# Patient Record
Sex: FEMALE | Race: WHITE | NOT HISPANIC OR LATINO | Employment: FULL TIME | URBAN - METROPOLITAN AREA
[De-identification: names, ages, dates, MRNs, and addresses within clinical notes are randomized per-mention and may not be internally consistent; named-entity substitution may affect disease eponyms.]

---

## 2017-06-24 ENCOUNTER — APPOINTMENT (OUTPATIENT)
Dept: GENERAL RADIOLOGY | Facility: HOSPITAL | Age: 52
End: 2017-06-24

## 2017-06-24 ENCOUNTER — HOSPITAL ENCOUNTER (EMERGENCY)
Facility: HOSPITAL | Age: 52
Discharge: HOME | End: 2017-06-24
Attending: EMERGENCY MEDICINE | Admitting: EMERGENCY MEDICINE

## 2017-06-24 VITALS
TEMPERATURE: 98.9 F | HEIGHT: 68 IN | RESPIRATION RATE: 18 BRPM | HEART RATE: 61 BPM | OXYGEN SATURATION: 100 % | WEIGHT: 170 LBS | DIASTOLIC BLOOD PRESSURE: 97 MMHG | BODY MASS INDEX: 25.76 KG/M2 | SYSTOLIC BLOOD PRESSURE: 160 MMHG

## 2017-06-24 DIAGNOSIS — I10 ESSENTIAL HYPERTENSION: Primary | ICD-10-CM

## 2017-06-24 LAB
ALBUMIN SERPL-MCNC: 4.7 G/DL (ref 3.5–5.2)
ALBUMIN/GLOB SERPL: 1.6 G/DL
ALP SERPL-CCNC: 112 U/L (ref 39–117)
ALT SERPL W P-5'-P-CCNC: 18 U/L (ref 1–33)
ANION GAP SERPL CALCULATED.3IONS-SCNC: 12.3 MMOL/L
AST SERPL-CCNC: 21 U/L (ref 1–32)
BASOPHILS # BLD AUTO: 0.04 10*3/MM3 (ref 0–0.2)
BASOPHILS NFR BLD AUTO: 0.4 % (ref 0–1.5)
BILIRUB SERPL-MCNC: 0.3 MG/DL (ref 0.1–1.2)
BUN BLD-MCNC: 10 MG/DL (ref 6–20)
BUN/CREAT SERPL: 10 (ref 7–25)
CALCIUM SPEC-SCNC: 9.4 MG/DL (ref 8.6–10.5)
CHLORIDE SERPL-SCNC: 101 MMOL/L (ref 98–107)
CO2 SERPL-SCNC: 27.7 MMOL/L (ref 22–29)
CREAT BLD-MCNC: 1 MG/DL (ref 0.57–1)
DEPRECATED RDW RBC AUTO: 44.6 FL (ref 37–54)
EOSINOPHIL # BLD AUTO: 0.16 10*3/MM3 (ref 0–0.7)
EOSINOPHIL NFR BLD AUTO: 1.6 % (ref 0.3–6.2)
ERYTHROCYTE [DISTWIDTH] IN BLOOD BY AUTOMATED COUNT: 13.1 % (ref 11.7–13)
GFR SERPL CREATININE-BSD FRML MDRD: 58 ML/MIN/1.73
GLOBULIN UR ELPH-MCNC: 3 GM/DL
GLUCOSE BLD-MCNC: 100 MG/DL (ref 65–99)
HCG SERPL QL: NEGATIVE
HCT VFR BLD AUTO: 39.6 % (ref 35.6–45.5)
HGB BLD-MCNC: 13.2 G/DL (ref 11.9–15.5)
HOLD SPECIMEN: NORMAL
IMM GRANULOCYTES # BLD: 0 10*3/MM3 (ref 0–0.03)
IMM GRANULOCYTES NFR BLD: 0 % (ref 0–0.5)
LYMPHOCYTES # BLD AUTO: 4.14 10*3/MM3 (ref 0.9–4.8)
LYMPHOCYTES NFR BLD AUTO: 40.7 % (ref 19.6–45.3)
MCH RBC QN AUTO: 31.1 PG (ref 26.9–32)
MCHC RBC AUTO-ENTMCNC: 33.3 G/DL (ref 32.4–36.3)
MCV RBC AUTO: 93.4 FL (ref 80.5–98.2)
MONOCYTES # BLD AUTO: 0.57 10*3/MM3 (ref 0.2–1.2)
MONOCYTES NFR BLD AUTO: 5.6 % (ref 5–12)
NEUTROPHILS # BLD AUTO: 5.27 10*3/MM3 (ref 1.9–8.1)
NEUTROPHILS NFR BLD AUTO: 51.7 % (ref 42.7–76)
PLATELET # BLD AUTO: 280 10*3/MM3 (ref 140–500)
PMV BLD AUTO: 10.4 FL (ref 6–12)
POTASSIUM BLD-SCNC: 4.3 MMOL/L (ref 3.5–5.2)
PROT SERPL-MCNC: 7.7 G/DL (ref 6–8.5)
RBC # BLD AUTO: 4.24 10*6/MM3 (ref 3.9–5.2)
SODIUM BLD-SCNC: 141 MMOL/L (ref 136–145)
TROPONIN T SERPL-MCNC: <0.01 NG/ML (ref 0–0.03)
WBC NRBC COR # BLD: 10.18 10*3/MM3 (ref 4.5–10.7)
WHOLE BLOOD HOLD SPECIMEN: NORMAL
WHOLE BLOOD HOLD SPECIMEN: NORMAL

## 2017-06-24 PROCEDURE — 84484 ASSAY OF TROPONIN QUANT: CPT | Performed by: EMERGENCY MEDICINE

## 2017-06-24 PROCEDURE — 80053 COMPREHEN METABOLIC PANEL: CPT | Performed by: EMERGENCY MEDICINE

## 2017-06-24 PROCEDURE — 93005 ELECTROCARDIOGRAM TRACING: CPT

## 2017-06-24 PROCEDURE — 85025 COMPLETE CBC W/AUTO DIFF WBC: CPT | Performed by: EMERGENCY MEDICINE

## 2017-06-24 PROCEDURE — 99284 EMERGENCY DEPT VISIT MOD MDM: CPT

## 2017-06-24 PROCEDURE — 36415 COLL VENOUS BLD VENIPUNCTURE: CPT | Performed by: EMERGENCY MEDICINE

## 2017-06-24 PROCEDURE — 71020 HC CHEST PA AND LATERAL: CPT

## 2017-06-24 PROCEDURE — 93010 ELECTROCARDIOGRAM REPORT: CPT | Performed by: INTERNAL MEDICINE

## 2017-06-24 PROCEDURE — 84703 CHORIONIC GONADOTROPIN ASSAY: CPT | Performed by: EMERGENCY MEDICINE

## 2017-06-24 RX ORDER — ZOLPIDEM TARTRATE 10 MG/1
10 TABLET ORAL NIGHTLY PRN
COMMUNITY

## 2017-06-24 RX ORDER — SODIUM CHLORIDE 0.9 % (FLUSH) 0.9 %
10 SYRINGE (ML) INJECTION AS NEEDED
Status: DISCONTINUED | OUTPATIENT
Start: 2017-06-24 | End: 2017-06-24 | Stop reason: HOSPADM

## 2017-06-24 RX ORDER — AMLODIPINE BESYLATE 5 MG/1
5 TABLET ORAL DAILY
Qty: 30 TABLET | Refills: 0 | Status: SHIPPED | OUTPATIENT
Start: 2017-06-24 | End: 2017-06-27 | Stop reason: SDUPTHER

## 2017-06-24 RX ORDER — AMLODIPINE BESYLATE 10 MG/1
10 TABLET ORAL ONCE
Status: COMPLETED | OUTPATIENT
Start: 2017-06-24 | End: 2017-06-24

## 2017-06-24 RX ADMIN — AMLODIPINE BESYLATE 10 MG: 10 TABLET ORAL at 16:52

## 2017-06-24 NOTE — ED PROVIDER NOTES
" EMERGENCY DEPARTMENT ENCOUNTER    CHIEF COMPLAINT  Chief Complaint: hypertension  History given by: pt  History limited by: nothing  Room Number:   PMD: No Known Provider      HPI:  Pt is a 52 y.o. female who presents complaining of worsening hypertension onset earlier today. Pt has chronic hypertension and is on a beta blocker. Pt admits to chest pain, SOA, diaphoresis, and restlessness but denies headache during this episode.    Duration:  Quarter day  Onset: gradual  Timing: constant  Location: N/A  Radiation: none  Quality: \"out of control BP\"  Intensity/Severity: moderate  Progression: worsening  Associated Symptoms: chest pain, SOA, diaphoresis, restlessness  Aggravating Factors: unknown  Alleviating Factors: unknown  Previous Episodes: Pt has chronic hypertension.  Treatment before arrival: PT is on a beta blocker for hypertension.    PAST MEDICAL HISTORY  Active Ambulatory Problems     Diagnosis Date Noted   • Cervical disc disorder with radiculopathy of mid-cervical region 2016   • Degeneration of lumbar intervertebral disc 2016     Resolved Ambulatory Problems     Diagnosis Date Noted   • No Resolved Ambulatory Problems     Past Medical History:   Diagnosis Date   • Anxiety    • Arthritis    • Chronic back pain    • Chronic neck pain    • Hypertension    • Injury of back    • Migraine        PAST SURGICAL HISTORY  Past Surgical History:   Procedure Laterality Date   • BACK SURGERY      lumbar X 2   • BREAST AUGMENTATION Bilateral    •  SECTION     • DILATATION AND CURETTAGE     • FOOT SURGERY Right     bunionectormy, x2, bunion and 2nd toe   • LUMBAR SPINE SURGERY     • SHOULDER SURGERY      slap tear repair   • THORACIC SPINE SURGERY         FAMILY HISTORY  Family History   Problem Relation Age of Onset   • Diabetes Mother    • Cancer Brother      COLON   • Hypertension Brother    • Heart disease Brother    • Hypertension Brother        SOCIAL HISTORY  Social History     Social " History   • Marital status: Single     Spouse name: N/A   • Number of children: 1   • Years of education: COLLEGE     Occupational History   • SURGICAL TECH/ASSISTANT       Social History Main Topics   • Smoking status: Current Every Day Smoker     Packs/day: 0.50     Types: Cigarettes   • Smokeless tobacco: Not on file   • Alcohol use No   • Drug use: Yes     Special: Oxycodone   • Sexual activity: Defer     Other Topics Concern   • Not on file     Social History Narrative       ALLERGIES  Review of patient's allergies indicates no known allergies.    REVIEW OF SYSTEMS  Review of Systems   Constitutional: Positive for diaphoresis. Negative for fever.   HENT: Negative for sore throat.    Eyes: Negative.    Respiratory: Positive for shortness of breath. Negative for cough.    Cardiovascular: Positive for chest pain.   Gastrointestinal: Negative for abdominal pain, diarrhea and vomiting.   Genitourinary: Negative for dysuria.   Musculoskeletal: Negative for neck pain.   Skin: Negative for rash.   Allergic/Immunologic: Negative.    Neurological: Negative for weakness, numbness and headaches.        Restlessness   Hematological: Negative.    Psychiatric/Behavioral: Negative.    All other systems reviewed and are negative.      PHYSICAL EXAM  ED Triage Vitals   Temp Heart Rate Resp BP SpO2   06/24/17 1432 06/24/17 1432 06/24/17 1432 06/24/17 1500 06/24/17 1432   98.9 °F (37.2 °C) 56 15 179/109 99 %      Temp src Heart Rate Source Patient Position BP Location FiO2 (%)   06/24/17 1432 -- -- -- --   Tympanic           Physical Exam   Constitutional: She is oriented to person, place, and time and well-developed, well-nourished, and in no distress. No distress.   HENT:   Head: Normocephalic and atraumatic.   Eyes: EOM are normal. Pupils are equal, round, and reactive to light.   Neck: Normal range of motion. Neck supple.   Cardiovascular: Normal rate, regular rhythm and normal heart sounds.    Pulmonary/Chest: Effort normal  and breath sounds normal. No respiratory distress.   Abdominal: Soft. There is no tenderness. There is no rebound and no guarding.   Musculoskeletal: Normal range of motion. She exhibits no edema.   Neurological: She is alert and oriented to person, place, and time. She has normal sensation and normal strength.   Skin: Skin is warm and dry. No rash noted.   Psychiatric: Mood and affect normal.   Nursing note and vitals reviewed.      LAB RESULTS  Lab Results (last 24 hours)     Procedure Component Value Units Date/Time    CBC & Differential [[id number]] Collected:  06/24/17 1514    Specimen:  Blood Updated:  06/24/17 1535    Narrative:       The following orders were created for panel order CBC & Differential.  Procedure                               Abnormality         Status                     ---------                               -----------         ------                     CBC Auto Differential[[id number]]         Abnormal            Final result                 Please view results for these tests on the individual orders.    Comprehensive Metabolic Panel [[id number]]  (Abnormal) Collected:  06/24/17 1514    Specimen:  Blood Updated:  06/24/17 1605     Glucose 100 (H) mg/dL      BUN 10 mg/dL      Creatinine 1.00 mg/dL      Sodium 141 mmol/L      Potassium 4.3 mmol/L      Chloride 101 mmol/L      CO2 27.7 mmol/L      Calcium 9.4 mg/dL      Total Protein 7.7 g/dL      Albumin 4.70 g/dL      ALT (SGPT) 18 U/L      AST (SGOT) 21 U/L      Alkaline Phosphatase 112 U/L      Total Bilirubin 0.3 mg/dL      eGFR Non African Amer 58 (L) mL/min/1.73      Globulin 3.0 gm/dL      A/G Ratio 1.6 g/dL      BUN/Creatinine Ratio 10.0     Anion Gap 12.3 mmol/L     Troponin [[id number]]  (Normal) Collected:  06/24/17 1514    Specimen:  Blood Updated:  06/24/17 1605     Troponin T <0.010 ng/mL     Narrative:       Troponin T Reference Ranges:  Less than 0.03 ng/mL:    Negative for AMI  0.03 to 0.09 ng/mL:      Indeterminant for  AMI  Greater than 0.09 ng/mL: Positive for AMI    hCG, Serum, Qualitative [[id number]]  (Normal) Collected:  06/24/17 1514    Specimen:  Blood Updated:  06/24/17 1551     HCG Qualitative Negative    CBC Auto Differential [[id number]]  (Abnormal) Collected:  06/24/17 1514    Specimen:  Blood Updated:  06/24/17 1535     WBC 10.18 10*3/mm3      RBC 4.24 10*6/mm3      Hemoglobin 13.2 g/dL      Hematocrit 39.6 %      MCV 93.4 fL      MCH 31.1 pg      MCHC 33.3 g/dL      RDW 13.1 (H) %      RDW-SD 44.6 fl      MPV 10.4 fL      Platelets 280 10*3/mm3      Neutrophil % 51.7 %      Lymphocyte % 40.7 %      Monocyte % 5.6 %      Eosinophil % 1.6 %      Basophil % 0.4 %      Immature Grans % 0.0 %      Neutrophils, Absolute 5.27 10*3/mm3      Lymphocytes, Absolute 4.14 10*3/mm3      Monocytes, Absolute 0.57 10*3/mm3      Eosinophils, Absolute 0.16 10*3/mm3      Basophils, Absolute 0.04 10*3/mm3      Immature Grans, Absolute 0.00 10*3/mm3           I ordered the above labs and reviewed the results    RADIOLOGY  XR Chest 2 View   Final Result   No focal pulmonary consolidation. COPD change. Follow-up as   clinical indications persist.       This report was finalized on 6/24/2017 3:57 PM by Dr. Ralph Griffin MD.               I ordered the above noted radiological studies. Interpreted by radiologist. Reviewed by me in PACS.       PROCEDURES  Procedures    EKG           EKG time: 1508  Rhythm/Rate: 49 Sinus bradycardia  P waves and IN: normal  QRS, axis: normal   ST and T waves: nonspecific ST changes     Interpreted Contemporaneously by me, independently viewed  unchanged compared to prior 3/27/2016      PROGRESS AND CONSULTS  ED Course     1504 - Ordered labs, EKG, and CXR for further evaluation.    1823 - Pt rechecked. Pt is resting comfortably and BP is lowering. Discussed plan to discharge the pt and slowly take the pt off of her beta blocker. Pt understands and agrees with plan.    MEDICAL DECISION MAKING  Results were  reviewed/discussed with the patient and they were also made aware of online access. Pt also made aware that some labs, such as cultures, will not be resulted during ER visit and follow up with PMD is necessary.     MDM  Number of Diagnoses or Management Options  Essential hypertension:      Amount and/or Complexity of Data Reviewed  Clinical lab tests: ordered and reviewed (unremarkable)  Tests in the radiology section of CPT®: ordered and reviewed (No focal pulmonary consolidation. COPD change. Follow-up as clinical indications persist.)  Tests in the medicine section of CPT®: ordered and reviewed (See EKG procedure note)           DIAGNOSIS  Final diagnoses:   Essential hypertension       DISPOSITION  DISCHARGE     Patient discharged in stable condition.    Reviewed implications of results, diagnosis, meds, responsibility to follow up, warning signs and symptoms of possible worsening, potential complications and reasons to return to ER.    Patient/Family voiced understanding of above instructions.    Discussed plan for discharge, as there is no emergent indication for admission.  Pt/family is agreeable and understands need for follow up and repeat testing.  Pt is aware that discharge does not mean that nothing is wrong but it indicates no emergency is present that requires admission and they must continue care with follow-up as given below or physician of their choice.     FOLLOW-UP  [facility]  [address]  [facility] [id number] [phone]  Schedule an appointment as soon as possible for a visit in 2 days           Medication List      New Prescriptions          amLODIPine 5 MG tablet   Commonly known as:  NORVASC   Take 1 tablet by mouth Daily.         Stop          desipramine 25 MG tablet   Commonly known as:  NORPRAMIN       QUEtiapine 100 MG tablet   Commonly known as:  SEROquel               Latest Documented Vital Signs:  As of 6:01 PM  BP-  174/84 HR- 50 Temp- 98.9 °F (37.2 °C) (Tympanic) O2 sat- 100%    --  Documentation assistance provided by vidal Baca for Dr. Pollack.  Information recorded by the scribe was done at my direction and has been verified and validated by me.          Chris Baca  06/24/17 [id number]       Wood Pollack MD  06/24/17 [id number]

## 2017-06-24 NOTE — ED TRIAGE NOTES
Chief Complaint   Patient presents with   • Hypertension     /95 and 192/95 while at work today.

## 2017-06-27 ENCOUNTER — OFFICE VISIT (OUTPATIENT)
Dept: CARDIOLOGY | Facility: CLINIC | Age: 52
End: 2017-06-27

## 2017-06-27 VITALS
HEIGHT: 68 IN | BODY MASS INDEX: 25.13 KG/M2 | WEIGHT: 165.8 LBS | HEART RATE: 64 BPM | DIASTOLIC BLOOD PRESSURE: 80 MMHG | SYSTOLIC BLOOD PRESSURE: 120 MMHG

## 2017-06-27 DIAGNOSIS — R07.2 PRECORDIAL PAIN: ICD-10-CM

## 2017-06-27 DIAGNOSIS — R00.2 PALPITATIONS: ICD-10-CM

## 2017-06-27 DIAGNOSIS — R93.89 ABNORMAL CHEST X-RAY: ICD-10-CM

## 2017-06-27 DIAGNOSIS — R06.02 SHORTNESS OF BREATH: ICD-10-CM

## 2017-06-27 DIAGNOSIS — I10 ESSENTIAL HYPERTENSION: ICD-10-CM

## 2017-06-27 DIAGNOSIS — I10 ESSENTIAL HYPERTENSION: Primary | ICD-10-CM

## 2017-06-27 DIAGNOSIS — F17.210 CIGARETTE NICOTINE DEPENDENCE WITHOUT COMPLICATION: ICD-10-CM

## 2017-06-27 PROCEDURE — 99204 OFFICE O/P NEW MOD 45 MIN: CPT | Performed by: NURSE PRACTITIONER

## 2017-06-27 PROCEDURE — 93000 ELECTROCARDIOGRAM COMPLETE: CPT | Performed by: NURSE PRACTITIONER

## 2017-06-27 RX ORDER — METOPROLOL TARTRATE 50 MG/1
TABLET, FILM COATED ORAL
Qty: 90 TABLET | Refills: 1 | Status: SHIPPED | OUTPATIENT
Start: 2017-06-27 | End: 2018-05-18

## 2017-06-27 RX ORDER — AMLODIPINE BESYLATE 5 MG/1
5 TABLET ORAL DAILY
Qty: 30 TABLET | Refills: 1 | Status: SHIPPED | OUTPATIENT
Start: 2017-06-27 | End: 2017-07-13

## 2017-06-27 RX ORDER — METOPROLOL TARTRATE 75 MG/1
75 TABLET, FILM COATED ORAL 2 TIMES DAILY
Qty: 30 TABLET | Refills: 1 | Status: SHIPPED | OUTPATIENT
Start: 2017-06-27 | End: 2017-07-13

## 2017-06-27 NOTE — PATIENT INSTRUCTIONS
Schedule treadmill, echo, and holter monitor  Continue current medications    [facility] referral line: [phone]    Hypertension    Hypertension, commonly called high blood pressure, is when the force of blood pumping through your arteries is too strong. Your arteries are the blood vessels that carry blood from your heart throughout your body. A blood pressure reading consists of a higher number over a lower number, such as 110/72. The higher number (systolic) is the pressure inside your arteries when your heart pumps. The lower number (diastolic) is the pressure inside your arteries when your heart relaxes. Ideally you want your blood pressure below 120/80.  Hypertension forces your heart to work harder to pump blood. Your arteries may become narrow or stiff. Having untreated or uncontrolled hypertension can cause heart attack, stroke, kidney disease, and other problems.    RISK FACTORS  Some risk factors for high blood pressure are controllable. Others are not.   Risk factors you cannot control include:   · Race. You may be at higher risk if you are .  · Age. Risk increases with age.  · Gender. Men are at higher risk than women before age 45 years. After age 65, women are at higher risk than men.  ·   Risk factors you can control include:  · Not getting enough exercise or physical activity.  · Being overweight.  · Getting too much fat, sugar, calories, or salt in your diet.  · Drinking too much alcohol.  ·   SIGNS AND SYMPTOMS  Hypertension does not usually cause signs or symptoms. Extremely high blood pressure (hypertensive crisis) may cause headache, anxiety, shortness of breath, and nosebleed.  DIAGNOSIS  To check if you have hypertension, your health care provider will measure your blood pressure while you are seated, with your arm held at the level of your heart. It should be measured at least twice using the same arm. Certain conditions can cause a difference in blood pressure  between your right and left arms. A blood pressure reading that is higher than normal on one occasion does not mean that you need treatment. If it is not clear whether you have high blood pressure, you may be asked to return on a different day to have your blood pressure checked again. Or, you may be asked to monitor your blood pressure at home for 1 or more weeks.    TREATMENT  Treating high blood pressure includes making lifestyle changes and possibly taking medicine. Living a healthy lifestyle can help lower high blood pressure. You may need to change some of your habits.  Lifestyle changes may include:  · Following the DASH diet. This diet is high in fruits, vegetables, and whole grains. It is low in salt, red meat, and added sugars.  · Keep your sodium intake below 2,300 mg per day.  · Getting at least 30-45 minutes of aerobic exercise at least 4 times per week.  · Losing weight if necessary.  · Not smoking.  · Limiting alcoholic beverages.  · Learning ways to reduce stress.  Your health care provider may prescribe medicine if lifestyle changes are not enough to get your blood pressure under control, and if one of the following is true:  · You are 18-59 years of age and your systolic blood pressure is above 140.  · You are 60 years of age or older, and your systolic blood pressure is above 150.  · Your diastolic blood pressure is above 90.  · You have diabetes, and your systolic blood pressure is over 140 or your diastolic blood pressure is over 90.  · You have kidney disease and your blood pressure is above 140/90.  · You have heart disease and your blood pressure is above 140/90.  Your personal target blood pressure may vary depending on your medical conditions, your age, and other factors.    HOME CARE INSTRUCTIONS  · Have your blood pressure rechecked as directed by your health care provider.    · Take medicines only as directed by your health care provider. Follow the directions carefully. Blood pressure  medicines must be taken as prescribed. The medicine does not work as well when you skip doses. Skipping doses also puts you at risk for problems.  · Do not smoke.    · Monitor your blood pressure at home as directed by your health care provider.   SEEK MEDICAL CARE IF:   · You think you are having a reaction to medicines taken.  · You have recurrent headaches or feel dizzy.  · You have swelling in your ankles.  · You have trouble with your vision.  SEEK IMMEDIATE MEDICAL CARE IF:  · You develop a severe headache or confusion.  · You have unusual weakness, numbness, or feel faint.  · You have severe chest or abdominal pain.  · You vomit repeatedly.  · You have trouble breathing.  MAKE SURE YOU:   · Understand these instructions.  · Will watch your condition.  · Will get help right away if you are not doing well or get worse.     This information is not intended to replace advice given to you by your health care provider. Make sure you discuss any questions you have with your health care provider.     Document Released: 12/18/2006 Document Revised: 05/03/2016 Document Reviewed: 10/10/2014  [employer] Patient Education ©2017 [employer]

## 2017-06-27 NOTE — PROGRESS NOTES
"  Date of Office Visit: 2017  Encounter Provider: YESI Locke  Place of Service: [facility] CARDIOLOGY  Patient Name: Tatiana Reynolds  :1965    Chief Complaint   Patient presents with   • Hypertension   :     HPI: Tatiana Reynolds is a 52 y.o. female who presents today for evaluation of hypertension.  She reports a history of essential hypertension that's been treated with medication over the past 10 years.  She also has a past history of anxiety, chronic back pain, chronic neck pain, borderline diabetes, and borderline hyperlipidemia.  On 17 she was at work and had had a coffee with caffeine earlier that morning.  She said she felt \"hyper\" and was talking too much.  She said this was unusual for her so she decided check her blood pressure which was 190/90.  Later that afternoon around 2:30 PM she said she felt confused, had slightly slurred speech, chest tightness that she rated a 3 out of 10, shortness of breath, and diaphoresis.  She was taken to the emergency department for further evaluation.    Upon review of the ED records, her blood pressure was 179/109, heart rate 56, and temperature 98.9.  Comprehensive metabolic panel, troponin, CBC were within normal limits.  Chest x-ray showed no acute pulmonary abnormality and COPD change.  EKG tracing was interpreted as sinus bradycardia with a rate of 49 bpm.  She was recommended to start amlodipine 5 mg daily for better blood pressure control and to follow-up in our office for further evaluation.  She tells me that she was also recommended to stop her valsartan and taper off the beta blocker.  I did not see these recommendations in the ED notes.     She had chest pain the following day after leaving the ED. She does experience shortness of breath with exertion and occasional lower extremity edema. She denies PND, orthopnea, cough, dizziness, or syncope. She experiences occasional palpitations in which " she describes a racing heart.     Past Medical History:   Diagnosis Date   • Anxiety    • Arthritis     thoracic   • Chronic back pain     managed by Dr. Barry Riley    • Chronic neck pain     managed by Dr. Barry Riley   • Diabetes mellitus     borderline per patient    • Hyperlipidemia     borderline per patient    • Hypertension    • Injury of back    • Migraine        Past Surgical History:   Procedure Laterality Date   • BACK SURGERY      lumbar X 2   • BREAST AUGMENTATION Bilateral    •  SECTION     • DILATATION AND CURETTAGE     • FOOT SURGERY Right     bunionectormy, x2, bunion and 2nd toe   • LUMBAR SPINE SURGERY     • SHOULDER SURGERY      slap tear repair   • THORACIC SPINE SURGERY         Social History     Social History   • Marital status: Single     Spouse name: N/A   • Number of children: 1   • Years of education: COLLEGE     Occupational History   • SURGICAL TECH/ASSISTANT       Social History Main Topics   • Smoking status: Former Smoker     Packs/day: 0.50     Types: Cigarettes   • Smokeless tobacco: Not on file      Comment: started at age 14; quit on 2017   • Alcohol use No      Comment: Daily caffeine use   • Drug use: No      Comment: oxycodone prescribed by physician   • Sexual activity: Defer     Other Topics Concern   • Not on file     Social History Narrative       Family History   Problem Relation Age of Onset   • Diabetes Mother    • Hyperlipidemia Mother    • Cancer Brother      COLON   • Hypertension Brother    • Heart disease Brother    • Hypertension Brother        Review of Systems   Constitution: Positive for malaise/fatigue. Negative for chills, diaphoresis, fever, night sweats, weight gain and weight loss.   HENT: Negative for hearing loss, nosebleeds, sore throat and tinnitus.    Eyes: Negative for blurred vision, double vision, pain and visual disturbance.   Cardiovascular: Positive for chest pain, dyspnea on exertion, leg swelling and palpitations. Negative for  claudication, cyanosis, irregular heartbeat, near-syncope, orthopnea, paroxysmal nocturnal dyspnea and syncope.   Respiratory: Positive for snoring. Negative for cough, hemoptysis and wheezing.    Endocrine: Negative for cold intolerance, heat intolerance and polyuria.   Hematologic/Lymphatic: Negative for bleeding problem. Does not bruise/bleed easily.   Skin: Negative for color change, dry skin, flushing and itching.   Musculoskeletal: Positive for back pain, joint pain, joint swelling, myalgias and neck pain. Negative for falls, muscle cramps and muscle weakness.   Gastrointestinal: Negative for abdominal pain, constipation, heartburn, melena, nausea and vomiting.   Genitourinary: Positive for frequency. Negative for dysuria and hematuria.   Neurological: Positive for excessive daytime sleepiness. Negative for dizziness, light-headedness, loss of balance, numbness, paresthesias, seizures and vertigo.   Psychiatric/Behavioral: Positive for depression. Negative for altered mental status, memory loss and substance abuse. The patient is nervous/anxious. The patient does not have insomnia.    Allergic/Immunologic: Negative for environmental allergies.       No Known Allergies      Current Outpatient Prescriptions:   •  ALPRAZolam (XANAX) 0.5 MG tablet, Take 0.5 mg by mouth 2 (two) times a day as needed for anxiety., Disp: , Rfl:   •  amLODIPine (NORVASC) 5 MG tablet, Take 1 tablet by mouth Daily., Disp: 30 tablet, Rfl: 1  •  carisoprodol (SOMA) 350 MG tablet, Take 350 mg by mouth 4 (four) times a day as needed for muscle spasms., Disp: , Rfl:   •  estradiol (MINIVELLE, VIVELLE-DOT) 0.05 MG/24HR patch, Place 1 patch on the skin 1 (one) time per week., Disp: , Rfl:   •  lidocaine (LIDODERM) 5 %, Place 1 patch on the skin every day. Remove & Discard patch within 12 hours or as directed by MD, Disp: , Rfl:   •  medroxyPROGESTERone (PROVERA) 2.5 MG tablet, Take 2.5 mg by mouth daily., Disp: , Rfl:   •  metoprolol tartrate  "75 MG tablet, Take 75 mg by mouth 2 (Two) Times a Day., Disp: 30 tablet, Rfl: 1  •  oxyCODONE (ROXICODONE) 30 MG immediate release tablet, Take 30 mg by mouth every 6 (six) hours as needed for moderate pain (4-6)., Disp: , Rfl:   •  oxyCODONE ER (OxyCONTIN) 40 MG 12 hr tablet, Take 40 mg by mouth every 12 (twelve) hours., Disp: , Rfl:   •  promethazine (PHENERGAN) 25 MG suppository, Insert 1 suppository into the rectum every 6 (six) hours as needed for nausea or vomiting., Disp: 10 suppository, Rfl: 0  •  tiZANidine (ZANAFLEX) 4 MG tablet, Take 4 mg by mouth every 8 (eight) hours as needed for muscle spasms., Disp: , Rfl:   •  zolpidem (AMBIEN) 10 MG tablet, Take 10 mg by mouth At Night As Needed for Sleep., Disp: , Rfl:   •  metoprolol tartrate (LOPRESSOR) 50 MG tablet, Take 1.5 (75mg) po twice daily, Disp: 90 tablet, Rfl: 1     Objective:     Vitals:    06/27/17 1427 06/27/17 1428   BP: 120/80 120/80   BP Location: Right arm Left arm   Pulse: 64    Weight: 165 lb 12.8 oz (75.2 kg)    Height: 68\" (172.7 cm)      Body mass index is 25.21 kg/(m^2).    PHYSICAL EXAM:    Vitals Reviewed.   General Appearance: No acute distress, well developed and well nourished.   Eyes: Conjunctiva and lids: No erythema, swelling, or discharge. Sclera non-icteric. Glasses.   HENT: Atraumatic, normocephalic. External eyes, ears, and nose normal. No hearing loss noted. Mucous membranes normal. Lips not cyanotic. Neck supple with no tenderness.  Respiratory: No signs of respiratory distress. Respiration rhythm and depth normal.   Clear to auscultation. No rales, crackles, rhonchi, or wheezing auscultated.   Cardiovascular:  Jugular Venous Pressure: Normal  Heart Rate and Rhythm: Normal, Heart Sounds: Normal S1 and S2. No S3 or S4 noted.  Murmurs: No murmurs noted. No rubs, thrills, or gallops.   Arterial Pulses: Carotid pulses normal. No carotid bruit noted. Posterior tibialis and dorsalis pedis pulses normal.   Lower Extremities: No " edema noted.  Gastrointestinal:  Abdomen soft, non-distended, non-tender. Normal bowel sounds. No hepatomegaly.   Musculoskeletal: Normal movement of extremities  Skin and Nails: General appearance normal. No pallor, cyanosis, diaphoresis. Skin temperature normal. No clubbing of fingernails.   Psychiatric: Patient alert and oriented to person, place, and time. Speech and behavior appropriate. Normal mood and affect.       ECG 12 Lead  Date/Time: 6/27/2017 2:23 PM  Performed by: MANSOOR OCAMPO  Authorized by: MANSOOR OCAMPO   Comparison: compared with previous ECG from 3/27/2016  Similar to previous ECG  Rhythm: sinus rhythm  Rate: normal  BPM: 64  Conduction: conduction normal  ST Segments: ST segments normal  T Waves: T waves normal  QRS axis: normal  Other: no other findings  Clinical impression: normal ECG              Assessment:       Diagnosis Plan   1. Essential hypertension  amLODIPine (NORVASC) 5 MG tablet    metoprolol tartrate 75 MG tablet    Adult Transthoracic Echo Complete    Treadmill Stress Test    Holter Monitor - 24 Hour   2. Precordial pain  amLODIPine (NORVASC) 5 MG tablet    metoprolol tartrate 75 MG tablet    Adult Transthoracic Echo Complete    Treadmill Stress Test    Holter Monitor - 24 Hour    ECG 12 Lead   3. Shortness of breath  amLODIPine (NORVASC) 5 MG tablet    metoprolol tartrate 75 MG tablet    Adult Transthoracic Echo Complete    Treadmill Stress Test    Holter Monitor - 24 Hour   4. Palpitations  Adult Transthoracic Echo Complete    Treadmill Stress Test    Holter Monitor - 24 Hour   5. Cigarette nicotine dependence without complication  Adult Transthoracic Echo Complete    Treadmill Stress Test    Holter Monitor - 24 Hour   6. Abnormal chest x-ray            Plan:       1.  Essential Hypertension: The patient states that her blood pressure is really well-controlled today in the office.  At home earlier today was 179/86.  Her blood pressures according to her her home monitoring  of ranged 112//86 with an average of 140 over 70s and heart rates in the 60s.  I have questioned the accuracy of her blood pressure machine.  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  She is on the amlodipine 5 mg daily.  I have recommended that she not wean off the metoprolol tartrate and continue with this medication.  I'm questioning if she should've discontinued her valsartan.    2.  Chest Pain and Shortness of Breath: I will further evaluate with a 2-D echocardiogram and treadmill stress test.    3.  Palpitations: I will further evaluate with a 24-hour Holter monitor.  She has a history of a flip-flopping and racing heartbeat in the past.  I told her if she stops her beta blocker this may worsen her palpitations.    4.  Nicotine Dependence: She quit smoking this past Sunday.  I have highly recommended that she abstain from cigarette smoking.    5.  Abnormal Chest x-ray: Her chest x-ray showed possible COPD change.  I have recommended that she follow-up with her primary care physician.    6.  Primary Care Physician: She does not currently have a PCP in this area.  I've given her the [facility] PCP referral line to make arrangements for a new family physician.    As always, it has been a pleasure to participate in your patient's care.      Sincerely,         YESI Waddell

## 2017-07-03 ENCOUNTER — TELEPHONE (OUTPATIENT)
Dept: CARDIOLOGY | Facility: CLINIC | Age: 52
End: 2017-07-03

## 2017-07-03 ENCOUNTER — HOSPITAL ENCOUNTER (OUTPATIENT)
Dept: CARDIOLOGY | Facility: HOSPITAL | Age: 52
Discharge: HOME | End: 2017-07-03

## 2017-07-03 ENCOUNTER — HOSPITAL ENCOUNTER (OUTPATIENT)
Dept: CARDIOLOGY | Facility: HOSPITAL | Age: 52
Discharge: HOME | End: 2017-07-03
Admitting: NURSE PRACTITIONER

## 2017-07-03 DIAGNOSIS — R00.2 PALPITATIONS: ICD-10-CM

## 2017-07-03 DIAGNOSIS — R07.2 PRECORDIAL PAIN: ICD-10-CM

## 2017-07-03 DIAGNOSIS — F17.210 CIGARETTE NICOTINE DEPENDENCE WITHOUT COMPLICATION: ICD-10-CM

## 2017-07-03 DIAGNOSIS — I10 ESSENTIAL HYPERTENSION: ICD-10-CM

## 2017-07-03 DIAGNOSIS — R06.02 SHORTNESS OF BREATH: ICD-10-CM

## 2017-07-03 LAB
ASCENDING AORTA: 2.5 CM
BH CV ECHO MEAS - ACS: 1.7 CM
BH CV ECHO MEAS - AO MAX PG (FULL): 11.2 MMHG
BH CV ECHO MEAS - AO MAX PG: 13.2 MMHG
BH CV ECHO MEAS - AO MEAN PG (FULL): 5.3 MMHG
BH CV ECHO MEAS - AO MEAN PG: 6.4 MMHG
BH CV ECHO MEAS - AO ROOT AREA (BSA CORRECTED): 1.4
BH CV ECHO MEAS - AO ROOT AREA: 5.5 CM^2
BH CV ECHO MEAS - AO ROOT DIAM: 2.6 CM
BH CV ECHO MEAS - AO V2 MAX: 181.9 CM/SEC
BH CV ECHO MEAS - AO V2 MEAN: 116.8 CM/SEC
BH CV ECHO MEAS - AO V2 VTI: 35.4 CM
BH CV ECHO MEAS - AVA(I,A): 1.2 CM^2
BH CV ECHO MEAS - AVA(I,D): 1.2 CM^2
BH CV ECHO MEAS - AVA(V,A): 1.1 CM^2
BH CV ECHO MEAS - AVA(V,D): 1.1 CM^2
BH CV ECHO MEAS - BSA(HAYCOCK): 1.9 M^2
BH CV ECHO MEAS - BSA: 1.9 M^2
BH CV ECHO MEAS - BZI_BMI: 25.1 KILOGRAMS/M^2
BH CV ECHO MEAS - BZI_METRIC_HEIGHT: 172.7 CM
BH CV ECHO MEAS - BZI_METRIC_WEIGHT: 74.8 KG
BH CV ECHO MEAS - CONTRAST EF 4CH: 66.7 ML/M^2
BH CV ECHO MEAS - EDV(MOD-SP4): 63 ML
BH CV ECHO MEAS - EDV(TEICH): 86.1 ML
BH CV ECHO MEAS - EF(CUBED): 75.8 %
BH CV ECHO MEAS - EF(MOD-SP4): 66.7 %
BH CV ECHO MEAS - EF(TEICH): 68.1 %
BH CV ECHO MEAS - ESV(MOD-SP4): 21 ML
BH CV ECHO MEAS - ESV(TEICH): 27.5 ML
BH CV ECHO MEAS - FS: 37.7 %
BH CV ECHO MEAS - IVS/LVPW: 1.1
BH CV ECHO MEAS - IVSD: 1 CM
BH CV ECHO MEAS - LAT PEAK E' VEL: 7 CM/SEC
BH CV ECHO MEAS - LV DIASTOLIC VOL/BSA (35-75): 33.4 ML/M^2
BH CV ECHO MEAS - LV MASS(C)D: 135.8 GRAMS
BH CV ECHO MEAS - LV MASS(C)DI: 72.1 GRAMS/M^2
BH CV ECHO MEAS - LV MAX PG: 2.1 MMHG
BH CV ECHO MEAS - LV MEAN PG: 1.1 MMHG
BH CV ECHO MEAS - LV SYSTOLIC VOL/BSA (12-30): 11.1 ML/M^2
BH CV ECHO MEAS - LV V1 MAX: 71.6 CM/SEC
BH CV ECHO MEAS - LV V1 MEAN: 46.5 CM/SEC
BH CV ECHO MEAS - LV V1 VTI: 15.9 CM
BH CV ECHO MEAS - LVIDD: 4.4 CM
BH CV ECHO MEAS - LVIDS: 2.7 CM
BH CV ECHO MEAS - LVLD AP4: 6.7 CM
BH CV ECHO MEAS - LVLS AP4: 5.2 CM
BH CV ECHO MEAS - LVOT AREA (M): 2.8 CM^2
BH CV ECHO MEAS - LVOT AREA: 2.7 CM^2
BH CV ECHO MEAS - LVOT DIAM: 1.9 CM
BH CV ECHO MEAS - LVPWD: 0.89 CM
BH CV ECHO MEAS - MED PEAK E' VEL: 8 CM/SEC
BH CV ECHO MEAS - MR MAX PG: 14.7 MMHG
BH CV ECHO MEAS - MR MAX VEL: 191.4 CM/SEC
BH CV ECHO MEAS - MV A DUR: 0.13 SEC
BH CV ECHO MEAS - MV DEC SLOPE: 271.3 CM/SEC^2
BH CV ECHO MEAS - MV DEC TIME: 0.25 SEC
BH CV ECHO MEAS - MV E MAX VEL: 66.9 CM/SEC
BH CV ECHO MEAS - MV E/A: 0.92
BH CV ECHO MEAS - MV MAX PG: 2.2 MMHG
BH CV ECHO MEAS - MV MEAN PG: 0.88 MMHG
BH CV ECHO MEAS - MV P1/2T MAX VEL: 65.5 CM/SEC
BH CV ECHO MEAS - MV P1/2T: 70.7 MSEC
BH CV ECHO MEAS - MV V2 MAX: 74.2 CM/SEC
BH CV ECHO MEAS - MV V2 MEAN: 41.9 CM/SEC
BH CV ECHO MEAS - MV V2 VTI: 24.5 CM
BH CV ECHO MEAS - MVA P1/2T LCG: 3.4 CM^2
BH CV ECHO MEAS - MVA(P1/2T): 3.1 CM^2
BH CV ECHO MEAS - MVA(VTI): 1.7 CM^2
BH CV ECHO MEAS - PA ACC TIME: 0.13 SEC
BH CV ECHO MEAS - PA MAX PG (FULL): 4.2 MMHG
BH CV ECHO MEAS - PA MAX PG: 5.8 MMHG
BH CV ECHO MEAS - PA PR(ACCEL): 20.4 MMHG
BH CV ECHO MEAS - PA V2 MAX: 120.8 CM/SEC
BH CV ECHO MEAS - RAP SYSTOLE: 3 MMHG
BH CV ECHO MEAS - RV MAX PG: 1.6 MMHG
BH CV ECHO MEAS - RV MEAN PG: 0.82 MMHG
BH CV ECHO MEAS - RV V1 MAX: 63.1 CM/SEC
BH CV ECHO MEAS - RV V1 MEAN: 41.9 CM/SEC
BH CV ECHO MEAS - RV V1 VTI: 14.6 CM
BH CV ECHO MEAS - RVSP: 15 MMHG
BH CV ECHO MEAS - SI(AO): 103.5 ML/M^2
BH CV ECHO MEAS - SI(CUBED): 33.5 ML/M^2
BH CV ECHO MEAS - SI(LVOT): 22.7 ML/M^2
BH CV ECHO MEAS - SI(MOD-SP4): 22.3 ML/M^2
BH CV ECHO MEAS - SI(TEICH): 31.1 ML/M^2
BH CV ECHO MEAS - SUP REN AO DIAM: 1.7 CM
BH CV ECHO MEAS - SV(AO): 194.9 ML
BH CV ECHO MEAS - SV(CUBED): 63.1 ML
BH CV ECHO MEAS - SV(LVOT): 42.7 ML
BH CV ECHO MEAS - SV(MOD-SP4): 42 ML
BH CV ECHO MEAS - SV(TEICH): 58.6 ML
BH CV ECHO MEAS - TAPSE (>1.6): 2 CM2
BH CV ECHO MEAS - TR MAX VEL: 169.7 CM/SEC
BH CV STRESS BP STAGE 1: NORMAL
BH CV STRESS BP STAGE 2: NORMAL
BH CV STRESS DURATION MIN STAGE 1: 3
BH CV STRESS DURATION MIN STAGE 2: 3
BH CV STRESS DURATION SEC STAGE 1: 0
BH CV STRESS DURATION SEC STAGE 2: 0
BH CV STRESS DURATION SEC STAGE 3: 30
BH CV STRESS GRADE STAGE 1: 10
BH CV STRESS GRADE STAGE 2: 12
BH CV STRESS GRADE STAGE 3: 14
BH CV STRESS HR STAGE 1: 126
BH CV STRESS HR STAGE 2: 150
BH CV STRESS HR STAGE 3: 154
BH CV STRESS METS STAGE 1: 5
BH CV STRESS METS STAGE 2: 7.5
BH CV STRESS METS STAGE 3: 10
BH CV STRESS PROTOCOL 1: NORMAL
BH CV STRESS RECOVERY BP: NORMAL MMHG
BH CV STRESS RECOVERY HR: 88 BPM
BH CV STRESS SPEED STAGE 1: 1.7
BH CV STRESS SPEED STAGE 2: 2.5
BH CV STRESS SPEED STAGE 3: 3.4
BH CV STRESS STAGE 1: 1
BH CV STRESS STAGE 2: 2
BH CV STRESS STAGE 3: 3
BH CV XLRA - RV BASE: 2.6 CM
BH CV XLRA - TDI S': 14 CM/SEC
E/E' RATIO: 0.9
LEFT ATRIUM VOLUME INDEX: 19 ML/M2
MAXIMAL PREDICTED HEART RATE: 167 BPM
PERCENT MAX PREDICTED HR: 92.22 %
SINUS: 2.6 CM
STJ: 2.1 CM
STRESS BASELINE BP: NORMAL MMHG
STRESS BASELINE HR: 88 BPM
STRESS PERCENT HR: 108 %
STRESS POST ESTIMATED WORKLOAD: 7 METS
STRESS POST EXERCISE DUR MIN: 6 MIN
STRESS POST EXERCISE DUR SEC: 30 SEC
STRESS POST PEAK BP: NORMAL MMHG
STRESS POST PEAK HR: 154 BPM
STRESS TARGET HR: 142 BPM

## 2017-07-03 PROCEDURE — 93306 TTE W/DOPPLER COMPLETE: CPT | Performed by: INTERNAL MEDICINE

## 2017-07-03 PROCEDURE — 93018 CV STRESS TEST I&R ONLY: CPT | Performed by: INTERNAL MEDICINE

## 2017-07-03 PROCEDURE — 93016 CV STRESS TEST SUPVJ ONLY: CPT | Performed by: INTERNAL MEDICINE

## 2017-07-03 PROCEDURE — 93306 TTE W/DOPPLER COMPLETE: CPT

## 2017-07-03 PROCEDURE — 93017 CV STRESS TEST TRACING ONLY: CPT

## 2017-07-03 NOTE — TELEPHONE ENCOUNTER
I called the patient and informed her of the results of her echocardiogram and stress test, both of which were unremarkable.  She just had her Holter monitor placed today.  She has not yet had her blood pressure cuff checked at work.  This morning her blood pressure was 149/79 and her heart rate was 95.  She is going to bring her blood pressure cuff with her to work on Wednesday to have it checked for accuracy.  Further recommendations will be made pending the results of her Holter monitor.    Echo:  · Left ventricular systolic function is normal. Calculated EF = 66.7%. Estimated EF was in agreement with the calculated EF. Normal left ventricular cavity size and wall thickness noted. All left ventricular wall segments contract normally.  · Left ventricular diastolic function is normal.  · No significant valvular stenosis or insufficiency      Stress Test:  · No ECG evidence of myocardial ischemia.Negative clinical evidence of myocardial ischemia.  · The Duke Treadmill Score of 6.5 is consistent with a low risk for ischemic heart disease.

## 2017-07-13 ENCOUNTER — TELEPHONE (OUTPATIENT)
Dept: CARDIOLOGY | Facility: CLINIC | Age: 52
End: 2017-07-13

## 2017-07-13 DIAGNOSIS — I10 ESSENTIAL HYPERTENSION: Primary | ICD-10-CM

## 2017-07-13 RX ORDER — METOPROLOL TARTRATE 50 MG/1
TABLET, FILM COATED ORAL
Qty: 90 TABLET | Refills: 1 | Status: SHIPPED | OUTPATIENT
Start: 2017-07-13 | End: 2017-10-27 | Stop reason: SDUPTHER

## 2017-07-13 RX ORDER — AMLODIPINE BESYLATE 10 MG/1
10 TABLET ORAL DAILY
Qty: 30 TABLET | Refills: 1 | Status: SHIPPED | OUTPATIENT
Start: 2017-07-13 | End: 2017-09-10 | Stop reason: SDUPTHER

## 2017-07-13 NOTE — TELEPHONE ENCOUNTER
Holter Monitor Results:    Study Description  Monitor hooked-up on 7/3/2017 at 14:22 EDT. The monitor was scanned on 7/10/2017. The patient was monitored for 1 days 23 hours and 59 minutes. Indications for this exam include palpitations and precordial pain and shortness of breath . Total beats: 64516. Average HR: 66. Min HR: 46. Max HR: 124.   Study Findings  Patient diary was not submitted.The predominant rhythm noted during the testing period was sinus rhythm. Premature atrial contractions occured rarely. There was no evidence of atrial arrhythmias. There were no episodes of supraventricular tachycardia. Premature ventricular contractions occured rarely. The PVCs were unifocal and average less than one per hour. There were no episodes of ventricular tachycardia. Sinoatrial node conduction was normal. No atrioventricular block noted.   Study Impressions  A relatively benign monitor study.     She had an episode of dizziness when wearing the holter monitor.     Echo and treadmill normal.     She does not have a PCP and is going to obtain one. She has been checking her BP and is averaging 140s systolic. I have recommended that she increase her amlodipine to 10 mg daily.  She is asking [facility] new prescription for Toprol tartrate and amlodipine.  I've asked her to check her blood pressure daily and call me in one week with her blood pressure readings.  She verbalized understanding.

## 2017-07-13 NOTE — TELEPHONE ENCOUNTER
----- Message from YESI Thorpe sent at 7/10/2017  7:09 AM EDT -----    Results pending     ----- Message -----     From: YESI Thorpe     Sent: 7/6/2017   5:31 PM       To: YESI Thorpe      Patient given the results of echo and stress test by GORDY Curiel.  Holter pending.     ----- Message -----     From: YESI Thorpe     Sent: 6/28/2017   8:57 AM       To: YESI Thorpe      Follow-up on treadmill, echo, Holter 7/3/17  Reassess patient's home blood pressure machine and the accuracy of her blood pressure machine  Discuss if she needs to restart valsartan

## 2017-07-21 ENCOUNTER — TELEPHONE (OUTPATIENT)
Dept: CARDIOLOGY | Facility: CLINIC | Age: 52
End: 2017-07-21

## 2017-07-21 NOTE — TELEPHONE ENCOUNTER
----- Message from YESI Thorpe sent at 7/13/2017  1:48 PM EDT -----    Patient to call me in one week with blood pressure readings on increase amlodipine 10 mg daily

## 2017-07-21 NOTE — TELEPHONE ENCOUNTER
I spoke with patient via phone. She is taking the amlodipine 10 mg daily. Today her BP was 142/77. I have asked her to continue to monitor BP once daily. She will call me back in 2 weeks to let me know her blood pressure readings.

## 2017-09-01 RX ORDER — VALSARTAN 80 MG/1
TABLET ORAL
Qty: 30 TABLET | Refills: 6 | OUTPATIENT
Start: 2017-09-01

## 2017-09-10 RX ORDER — AMLODIPINE BESYLATE 10 MG/1
TABLET ORAL
Qty: 30 TABLET | Refills: 1 | Status: SHIPPED | OUTPATIENT
Start: 2017-09-10 | End: 2017-11-07 | Stop reason: SDUPTHER

## 2017-10-27 RX ORDER — METOPROLOL TARTRATE 50 MG/1
TABLET, FILM COATED ORAL
Qty: 90 TABLET | Refills: 1 | Status: SHIPPED | OUTPATIENT
Start: 2017-10-27 | End: 2017-12-25 | Stop reason: SDUPTHER

## 2017-11-07 ENCOUNTER — TELEPHONE (OUTPATIENT)
Dept: CARDIOLOGY | Facility: CLINIC | Age: 52
End: 2017-11-07

## 2017-11-07 RX ORDER — AMLODIPINE BESYLATE 10 MG/1
TABLET ORAL
Qty: 30 TABLET | Refills: 1 | Status: SHIPPED | OUTPATIENT
Start: 2017-11-07 | End: 2018-01-13 | Stop reason: SDUPTHER

## 2017-11-07 NOTE — TELEPHONE ENCOUNTER
I saw in July you advised pt to call back in 2 weeks with an update on how she is doing on Amlodipine.  I called patient to see how she is doing. Patient let me a message stating  she is feeling good on this medication.  I went ahead and filled her Amlodipine.  I do not see that patient has a follow up appointment.  Should patient have a follow up visit with you?  Bianca

## 2017-11-09 NOTE — TELEPHONE ENCOUNTER
I informed pt.  She is looking for a pcp but doesn't currently have one.  Patient will call back to make an appt in the next couple months if she doesn't have a pcp.  Bianca

## 2017-11-09 NOTE — TELEPHONE ENCOUNTER
Does she have a PCP yet? If not, you can schedule a follow up appointment with me for check up? Thanks.

## 2017-11-30 ENCOUNTER — APPOINTMENT (OUTPATIENT)
Dept: WOMENS IMAGING | Facility: HOSPITAL | Age: 52
End: 2017-11-30

## 2017-11-30 PROCEDURE — 77067 SCR MAMMO BI INCL CAD: CPT | Performed by: RADIOLOGY

## 2017-11-30 PROCEDURE — 77063 BREAST TOMOSYNTHESIS BI: CPT | Performed by: RADIOLOGY

## 2017-12-26 RX ORDER — METOPROLOL TARTRATE 50 MG/1
TABLET, FILM COATED ORAL
Qty: 90 TABLET | Refills: 1 | Status: SHIPPED | OUTPATIENT
Start: 2017-12-26 | End: 2018-05-18

## 2018-01-15 RX ORDER — AMLODIPINE BESYLATE 10 MG/1
TABLET ORAL
Qty: 30 TABLET | Refills: 1 | Status: SHIPPED | OUTPATIENT
Start: 2018-01-15 | End: 2018-03-11 | Stop reason: SDUPTHER

## 2018-03-12 RX ORDER — AMLODIPINE BESYLATE 10 MG/1
TABLET ORAL
Qty: 30 TABLET | Refills: 1 | Status: SHIPPED | OUTPATIENT
Start: 2018-03-12 | End: 2018-05-06 | Stop reason: SDUPTHER

## 2018-05-07 RX ORDER — AMLODIPINE BESYLATE 10 MG/1
TABLET ORAL
Qty: 30 TABLET | Refills: 1 | Status: SHIPPED | OUTPATIENT
Start: 2018-05-07 | End: 2018-07-10 | Stop reason: SDUPTHER

## 2018-05-18 ENCOUNTER — OFFICE VISIT (OUTPATIENT)
Dept: CARDIOLOGY | Facility: CLINIC | Age: 53
End: 2018-05-18

## 2018-05-18 VITALS
DIASTOLIC BLOOD PRESSURE: 72 MMHG | SYSTOLIC BLOOD PRESSURE: 140 MMHG | WEIGHT: 140.2 LBS | HEART RATE: 76 BPM | HEIGHT: 68 IN | BODY MASS INDEX: 21.25 KG/M2

## 2018-05-18 DIAGNOSIS — I10 ESSENTIAL HYPERTENSION: Primary | ICD-10-CM

## 2018-05-18 DIAGNOSIS — R00.2 PALPITATIONS: ICD-10-CM

## 2018-05-18 PROCEDURE — 93000 ELECTROCARDIOGRAM COMPLETE: CPT | Performed by: NURSE PRACTITIONER

## 2018-05-18 PROCEDURE — 99214 OFFICE O/P EST MOD 30 MIN: CPT | Performed by: NURSE PRACTITIONER

## 2018-05-18 RX ORDER — METOPROLOL TARTRATE 100 MG/1
100 TABLET ORAL 2 TIMES DAILY
Qty: 180 TABLET | Refills: 0 | OUTPATIENT
Start: 2018-05-18 | End: 2021-01-24

## 2018-05-18 NOTE — PROGRESS NOTES
Date of Office Visit: 2018  Encounter Provider: YESI Locke  Place of Service: [facility] CARDIOLOGY  Patient Name: Tatiana Reynolds  :1965    Chief Complaint   Patient presents with   • Essential hypertension     Yearly follow up   :     HPI: Tatiana Reynolds is a 53 y.o. female who presents today for annual follow. She has a history of essential hypertension that's been treated with medication over the past 10 years.  She also has a past history of anxiety, chronic back pain/neck pain, borderline diabetes, and borderline hyperlipidemia.      I evaluated her on 2017 after ED visit for hypertension and palpitations. I recommended that she continue with metoprolol tartrate.  She had just recently quit smoking.  She had an echocardiogram completed which revealed a normal EF of 66% and no significant valvular stenosis or insufficiency.  Stress test was normal showing no evidence of ischemia.  Holter monitor showed normal sinus rhythm with average heart rate of 66 bpm, rare APCs and PVCs.  I increased her amlodipine to 10 mg daily to help with better blood pressure control.  We talked over the telephone a few times and she felt that her blood pressure was better controlled.  She has remained off the valsartan.    She presents today for follow-up.  She says her blood pressures are typically averaging 127 over 80s.  In the evening sometimes she feels jittery and a pounding heartbeat.  She is checked her blood pressure which is been elevated 150/89.  She denies chest pain, shortness of air, PND, orthopnea, edema, palpitations, dizziness, or syncope.  She said that she's lost about 45 pounds over the past year from discontinuing soda and she is in the [employer] program.  According to our scales she is down 25 pounds.    She had chest pain the following day after leaving the ED. She does experience shortness of breath with exertion and occasional lower extremity  edema. She denies PND, orthopnea, cough, dizziness, or syncope. She experiences occasional palpitations in which she describes a racing heart.     Past Medical History:   Diagnosis Date   • Anxiety    • Arthritis     thoracic   • Chronic back pain     managed by Dr. Barry Riley    • Chronic neck pain     managed by Dr. Barry Riley   • Diabetes mellitus     borderline per patient    • Essential hypertension    • Hyperlipidemia     borderline per patient    • Injury of back    • Migraine    • Palpitations    • Precordial pain    • Shortness of breath        Past Surgical History:   Procedure Laterality Date   • BACK SURGERY      lumbar X 2   • BREAST AUGMENTATION Bilateral    •  SECTION     • DILATATION AND CURETTAGE     • FOOT SURGERY Right     bunionectormy, x2, bunion and 2nd toe   • LUMBAR SPINE SURGERY     • SHOULDER SURGERY      slap tear repair   • THORACIC SPINE SURGERY         Social History     Social History   • Marital status: Single     Spouse name: N/A   • Number of children: 1   • Years of education: COLLEGE     Occupational History   • SURGICAL TECH/ASSISTANT       Social History Main Topics   • Smoking status: Former Smoker     Packs/day: 0.50     Types: Cigarettes   • Smokeless tobacco: Not on file      Comment: started at age 14; quit on 2017   • Alcohol use No      Comment: Very Little caffeine use   • Drug use: No      Comment: oxycodone prescribed by physician   • Sexual activity: Defer       Family History   Problem Relation Age of Onset   • Diabetes Mother    • Hyperlipidemia Mother    • Cancer Brother         COLON   • Hypertension Brother    • Heart disease Brother    • Hypertension Brother        Review of Systems   Constitution: Positive for weight loss. Negative for chills, diaphoresis, fever, malaise/fatigue, night sweats and weight gain.   HENT: Negative for hearing loss, nosebleeds, sore throat and tinnitus.    Eyes: Negative for blurred vision, double vision, pain and  visual disturbance.   Cardiovascular: Positive for palpitations. Negative for chest pain, claudication, cyanosis, dyspnea on exertion, irregular heartbeat, leg swelling, near-syncope, orthopnea, paroxysmal nocturnal dyspnea and syncope.   Respiratory: Negative for cough, hemoptysis, snoring and wheezing.    Endocrine: Negative for cold intolerance, heat intolerance and polyuria.   Hematologic/Lymphatic: Negative for bleeding problem. Does not bruise/bleed easily.   Skin: Negative for color change, dry skin, flushing and itching.   Musculoskeletal: Positive for back pain, joint pain, myalgias and neck pain. Negative for falls, joint swelling, muscle cramps and muscle weakness.   Gastrointestinal: Negative for abdominal pain, constipation, heartburn, melena, nausea and vomiting.   Genitourinary: Negative for dysuria, frequency and hematuria.   Neurological: Negative for excessive daytime sleepiness, dizziness, light-headedness, loss of balance, numbness, paresthesias, seizures and vertigo.   Psychiatric/Behavioral: Negative for altered mental status, depression, memory loss and substance abuse. The patient is nervous/anxious. The patient does not have insomnia.    Allergic/Immunologic: Negative for environmental allergies.       No Known Allergies      Current Outpatient Prescriptions:   •  ALPRAZolam (XANAX) 0.5 MG tablet, Take 0.5 mg by mouth 2 (two) times a day as needed for anxiety., Disp: , Rfl:   •  amLODIPine (NORVASC) 10 MG tablet, take 1 tablet by mouth once daily, Disp: 30 tablet, Rfl: 1  •  carisoprodol (SOMA) 350 MG tablet, Take 350 mg by mouth 4 (four) times a day as needed for muscle spasms., Disp: , Rfl:   •  estradiol (MINIVELLE, VIVELLE-DOT) 0.05 MG/24HR patch, Place 1 patch on the skin 1 (one) time per week., Disp: , Rfl:   •  lidocaine (LIDODERM) 5 %, Place 1 patch on the skin every day. Remove & Discard patch within 12 hours or as directed by MD, Disp: , Rfl:   •  medroxyPROGESTERone (PROVERA) 2.5  "MG tablet, Take 2.5 mg by mouth daily., Disp: , Rfl:   •  metoprolol tartrate (LOPRESSOR) 50 MG tablet, Take 1.5 tablet by mouth 2 (Two) Times a Day., Disp: 180 tablet, Rfl: 0  •  oxyCODONE (ROXICODONE) 30 MG immediate release tablet, Take 30 mg by mouth every 6 (six) hours as needed for moderate pain (4-6)., Disp: , Rfl:   •  oxyCODONE ER (OxyCONTIN) 40 MG 12 hr tablet, Take 40 mg by mouth every 12 (twelve) hours., Disp: , Rfl:   •  promethazine (PHENERGAN) 25 MG suppository, Insert 1 suppository into the rectum every 6 (six) hours as needed for nausea or vomiting., Disp: 10 suppository, Rfl: 0  •  tiZANidine (ZANAFLEX) 4 MG tablet, Take 4 mg by mouth every 8 (eight) hours as needed for muscle spasms., Disp: , Rfl:   •  zolpidem (AMBIEN) 10 MG tablet, Take 10 mg by mouth At Night As Needed for Sleep., Disp: , Rfl:      Objective:     Vitals:    05/18/18 1329   BP: 140/72   BP Location: Left arm   Pulse: 76   Weight: 63.6 kg (140 lb 3.2 oz)   Height: 172.7 cm (68\")     Body mass index is 21.32 kg/m².    PHYSICAL EXAM:    Vitals Reviewed.   General Appearance: No acute distress, well developed and well nourished. Thin.   Eyes: Conjunctiva and lids: No erythema, swelling, or discharge. Sclera non-icteric. Glasses.   HENT: Atraumatic, normocephalic. External eyes, ears, and nose normal. No hearing loss noted. Mucous membranes normal. Lips not cyanotic. Neck supple with no tenderness.  Respiratory: No signs of respiratory distress. Respiration rhythm and depth normal.   Clear to auscultation. No rales, crackles, rhonchi, or wheezing auscultated.   Cardiovascular:  Jugular Venous Pressure: Normal  Heart Rate and Rhythm: Normal, Heart Sounds: Normal S1 and S2. No S3 or S4 noted.  Murmurs: No murmurs noted. No rubs, thrills, or gallops.   Arterial Pulses: Carotid pulses normal. No carotid bruit noted. Posterior tibialis and dorsalis pedis pulses normal.   Lower Extremities: No edema noted.  Gastrointestinal:  Abdomen soft, " non-distended, non-tender. Normal bowel sounds. No hepatomegaly.   Musculoskeletal: Normal movement of extremities  Skin and Nails: General appearance normal. No pallor, cyanosis, diaphoresis. Skin temperature normal. No clubbing of fingernails.   Psychiatric: Patient alert and oriented to person, place, and time. Speech and behavior appropriate. Normal mood and affect.       ECG 12 Lead  Date/Time: 5/18/2018 1:31 PM  Performed by: MANSOOR OCAMPO  Authorized by: MANSOOR OCAMPO   Comparison: compared with previous ECG from 6/27/2017  Similar to previous ECG  Rhythm: sinus rhythm  Rate: normal  BPM: 76  Conduction: conduction normal  ST Segments: ST segments normal  T Waves: T waves normal  QRS axis: normal  Clinical impression: normal ECG              Assessment:       Diagnosis Plan   1. Essential hypertension     2. Palpitations            Plan:       1.  Essential Hypertension: Her blood pressure is borderline elevated today.  She states her blood pressure during the day is running 127 over 80s on average at home sometimes it elevated at 150/89.  She felt jittery and has a pounding heartbeat.  We discussed her medications and she is done better off the valsartan.  She is currently taking metoprolol tartrate 75 mg twice daily.  She is going to increase to 100 mg twice a day and see if this helps her evening blood pressures and the palpitations.  I'll follow-up with her via telephone in a few weeks for reassessment.  She has any adverse effects of asked her to contact me at the office.    2.  Palpitations: Increased beta blocker.    3.  She would like to follow-up in our office in one year and I she will see the cardiologist, Dr. Andrae Cano.  She will be a new patient to her.    As always, it has been a pleasure to participate in your patient's care.      Sincerely,         YESI Waddell

## 2018-06-07 ENCOUNTER — TELEPHONE (OUTPATIENT)
Dept: CARDIOLOGY | Facility: CLINIC | Age: 53
End: 2018-06-07

## 2018-06-07 NOTE — TELEPHONE ENCOUNTER
----- Message from YESI Thorpe sent at 5/18/2018  2:02 PM EDT -----    Follow up with her about jittery feeling in evening on metoprolol tartrate 100 mg twice daily

## 2018-06-11 NOTE — TELEPHONE ENCOUNTER
I called and s/w pt.  She states she is feeling better.  She decreased her Metoprolol Tart to 100mg QD instead of BID.  Her BP is running 150-140's/80.  She has been on vacation for the past week.  She will call us back next week./airam

## 2018-07-10 RX ORDER — AMLODIPINE BESYLATE 10 MG/1
TABLET ORAL
Qty: 30 TABLET | Refills: 1 | Status: SHIPPED | OUTPATIENT
Start: 2018-07-10 | End: 2018-09-04 | Stop reason: SDUPTHER

## 2018-09-04 RX ORDER — AMLODIPINE BESYLATE 10 MG/1
TABLET ORAL
Qty: 30 TABLET | Refills: 5 | Status: SHIPPED | OUTPATIENT
Start: 2018-09-04 | End: 2019-03-20 | Stop reason: SDUPTHER

## 2018-12-03 ENCOUNTER — APPOINTMENT (OUTPATIENT)
Dept: WOMENS IMAGING | Facility: HOSPITAL | Age: 53
End: 2018-12-03

## 2018-12-03 PROCEDURE — 77067 SCR MAMMO BI INCL CAD: CPT | Performed by: RADIOLOGY

## 2019-01-21 ENCOUNTER — TELEPHONE (OUTPATIENT)
Dept: NEUROSURGERY | Facility: CLINIC | Age: 54
End: 2019-01-21

## 2019-01-21 DIAGNOSIS — M51.36 DEGENERATION OF LUMBAR INTERVERTEBRAL DISC: Primary | ICD-10-CM

## 2019-01-21 NOTE — TELEPHONE ENCOUNTER
Per Dr GREEN, pt works in OR at [employer], needs MRI lumbar spine with and without and plain films with flex/ext see within next month    LM for pt letting her know that [facility] can do the MRI on 1.28.19 at 8:15 am, she needs to arrive at 7:45 am and she has a FU appt with Dr GREEN for 2.8.19 at  2pm.    We only have one number for pt, letter sent to pt as well with appt dates and times

## 2019-01-21 NOTE — TELEPHONE ENCOUNTER
Spoke with pt and confirmed with her that she did get the info about her MRI appt and appt here in the office with Dr GREEN

## 2019-01-28 ENCOUNTER — HOSPITAL ENCOUNTER (OUTPATIENT)
Dept: MRI IMAGING | Facility: HOSPITAL | Age: 54
Discharge: HOME | End: 2019-01-28
Attending: NEUROLOGICAL SURGERY | Admitting: NEUROLOGICAL SURGERY

## 2019-01-28 DIAGNOSIS — M51.36 DEGENERATION OF LUMBAR INTERVERTEBRAL DISC: ICD-10-CM

## 2019-01-28 LAB — CREAT BLDA-MCNC: 0.9 MG/DL (ref 0.6–1.3)

## 2019-01-28 PROCEDURE — 72158 MRI LUMBAR SPINE W/O & W/DYE: CPT

## 2019-01-28 PROCEDURE — 0 GADOBENATE DIMEGLUMINE 529 MG/ML SOLUTION: Performed by: NEUROLOGICAL SURGERY

## 2019-01-28 PROCEDURE — 82565 ASSAY OF CREATININE: CPT

## 2019-01-28 PROCEDURE — A9577 INJ MULTIHANCE: HCPCS | Performed by: NEUROLOGICAL SURGERY

## 2019-01-28 RX ADMIN — GADOBENATE DIMEGLUMINE 12 ML: 529 INJECTION, SOLUTION INTRAVENOUS at 08:49

## 2019-02-05 NOTE — PROGRESS NOTES
Subjective   Patient ID: Tatiana Reynolds is a 54 y.o. female is here today for follow-up to discuss lumbar MRI and plain films due to increased low back pain.  Patient was last seen 9.9.16 for for low back pain.    Patient states that she is having increased back pain in past 6 months.  Patient is having severe constant low back, left posterior thigh and buttock pain, left leg weakness and N/T.  She is taking Roxicodone QID, Lidoderm Patch prn, Soma QID as prescribed by Dr Riley.  Recent injections and RFA with Dr Riley have not been helpful.        Back Pain   The problem occurs constantly. The pain is present in the lumbar spine. The pain radiates to the left thigh. The pain is at a severity of 10/10. The pain is severe. Associated symptoms include leg pain, numbness, tingling and weakness.   Leg Pain    The pain is present in the left leg. The pain is at a severity of 10/10. The pain has been constant since onset. Associated symptoms include numbness and tingling.   Extremity Weakness    The pain is present in the left lower leg and left upper leg. The problem occurs constantly. The pain is at a severity of 10/10. Associated symptoms include numbness and tingling.       The following portions of the patient's history were reviewed and updated as appropriate: allergies, current medications, past family history, past medical history, past social history, past surgical history and problem list.    Review of Systems   Musculoskeletal: Positive for arthralgias, back pain and extremity weakness.   Neurological: Positive for tingling, weakness and numbness.   All other systems reviewed and are negative.    I saw this patient nearly 3 years ago for chronic back and neck pain. She came back to see me because the back and the left legs have gotten progressively worse despite multiple treatments by Dr. Riley, including RFA of the low back. She has had RFA of the neck and she continues to have neck pan and bilateral  arm pain but the low back and left leg are the worst. She is on numerous medications supplied by Dr. Riley. We discussed the MRI which really did not show any compressive lesions or instability, and I do not think she needs a fusion or a diskectomy or decompression. She is quite miserable with her pain. She has discussed with Dr. Riley the possibility of doing a spinal cord stimulator and I think that is actually an excellent option for her. We will refer her back to Dr. Riley specifically to discuss that. I would be happy to put a surgical lead in if the need arises. We did not get a new image of her neck but I think it is reasonable to do that since she would not be able to have an MRI after stimulator is placed. Will have her come back after MRI of the neck as well as x-rays.         Objective   Physical Exam   Constitutional: She is oriented to person, place, and time. She appears well-developed and well-nourished.   HENT:   Head: Normocephalic and atraumatic.   Eyes: Conjunctivae and EOM are normal. Pupils are equal, round, and reactive to light.   Fundoscopic exam:       The right eye shows no papilledema. The right eye shows venous pulsations.        The left eye shows no papilledema. The left eye shows venous pulsations.   Neck: Carotid bruit is not present.   Neurological: She is oriented to person, place, and time. She has a normal Finger-Nose-Finger Test and a normal Heel to Shin Test. Gait normal.   Reflex Scores:       Tricep reflexes are 2+ on the right side and 2+ on the left side.       Bicep reflexes are 2+ on the right side and 2+ on the left side.       Brachioradialis reflexes are 2+ on the right side and 2+ on the left side.       Patellar reflexes are 2+ on the right side and 2+ on the left side.       Achilles reflexes are 2+ on the right side and 2+ on the left side.  Psychiatric: Her speech is normal.     Neurologic Exam     Mental Status   Oriented to person, place, and time.    Registration of memory: Good recent and remote memory.   Attention: normal. Concentration: normal.   Speech: speech is normal   Level of consciousness: alert  Knowledge: consistent with education.     Cranial Nerves     CN II   Visual fields full to confrontation.   Visual acuity: normal    CN III, IV, VI   Pupils are equal, round, and reactive to light.  Extraocular motions are normal.     CN V   Facial sensation intact.   Right corneal reflex: normal  Left corneal reflex: normal    CN VII   Facial expression full, symmetric.   Right facial weakness: none  Left facial weakness: none    CN VIII   Hearing: intact    CN IX, X   Palate: symmetric    CN XI   Right sternocleidomastoid strength: normal  Left sternocleidomastoid strength: normal    CN XII   Tongue: not atrophic  Tongue deviation: none    Motor Exam   Muscle bulk: normal  Right arm tone: normal  Left arm tone: normal  Right leg tone: normal  Left leg tone: normal    Strength   Strength 5/5 except as noted.     Sensory Exam   Light touch normal.     Gait, Coordination, and Reflexes     Gait  Gait: normal    Coordination   Finger to nose coordination: normal  Heel to shin coordination: normal    Reflexes   Right brachioradialis: 2+  Left brachioradialis: 2+  Right biceps: 2+  Left biceps: 2+  Right triceps: 2+  Left triceps: 2+  Right patellar: 2+  Left patellar: 2+  Right achilles: 2+  Left achilles: 2+  Right : 2+  Left : 2+      Assessment/Plan   Independent Review of Radiographic Studies:    Reviewed her lumbar MRI done 1/28/90 as well as her x-rays done to/8/19.  The show postoperative changes on the right at L4-L5 but no significant nerve root compression on the left.  There is disc space collapse and postoperative changes and some facet disease.  X-rays do not show any spondylolisthesis or instability.  Agree with the report.      Medical Decision Making:    Because of the chronic neck pain we'll go ahead and get a cervical MRI with x-rays.   We'll also refer specifically back to Dr. Riley for lumbar spinal cord stimulator trial.  If the trial helps I be happy to put in a surgical lead if it is deemed necessary.  We'll see her after the cervical MRI and x-rays.  Next      Tatiana was seen today for back pain, leg pain and extremity weakness.    Diagnoses and all orders for this visit:    Degeneration of lumbar intervertebral disc  -     Ambulatory Referral to Pain Management    Chronic bilateral low back pain with left-sided sciatica  -     Ambulatory Referral to Pain Management    Chronic neck pain  -     MRI Cervical Spine Without Contrast; Future  -     XR Spine Cervical Complete With Flex Ext; Future      Return in about 2 weeks (around 2/22/2019) for after tests.

## 2019-02-08 ENCOUNTER — HOSPITAL ENCOUNTER (OUTPATIENT)
Dept: GENERAL RADIOLOGY | Facility: HOSPITAL | Age: 54
Discharge: HOME | End: 2019-02-08
Admitting: NEUROLOGICAL SURGERY

## 2019-02-08 ENCOUNTER — OFFICE VISIT (OUTPATIENT)
Dept: NEUROSURGERY | Facility: CLINIC | Age: 54
End: 2019-02-08

## 2019-02-08 VITALS
HEIGHT: 68 IN | HEART RATE: 64 BPM | DIASTOLIC BLOOD PRESSURE: 73 MMHG | SYSTOLIC BLOOD PRESSURE: 116 MMHG | WEIGHT: 130 LBS | BODY MASS INDEX: 19.7 KG/M2

## 2019-02-08 DIAGNOSIS — M51.36 DEGENERATION OF LUMBAR INTERVERTEBRAL DISC: ICD-10-CM

## 2019-02-08 DIAGNOSIS — M51.36 DEGENERATION OF LUMBAR INTERVERTEBRAL DISC: Primary | ICD-10-CM

## 2019-02-08 DIAGNOSIS — G89.29 CHRONIC NECK PAIN: ICD-10-CM

## 2019-02-08 DIAGNOSIS — M54.2 CHRONIC NECK PAIN: ICD-10-CM

## 2019-02-08 DIAGNOSIS — G89.29 CHRONIC BILATERAL LOW BACK PAIN WITH LEFT-SIDED SCIATICA: ICD-10-CM

## 2019-02-08 DIAGNOSIS — M54.42 CHRONIC BILATERAL LOW BACK PAIN WITH LEFT-SIDED SCIATICA: ICD-10-CM

## 2019-02-08 PROCEDURE — 99214 OFFICE O/P EST MOD 30 MIN: CPT | Performed by: NEUROLOGICAL SURGERY

## 2019-02-08 PROCEDURE — 72114 X-RAY EXAM L-S SPINE BENDING: CPT

## 2019-02-18 RX ORDER — METHYLPREDNISOLONE 4 MG/1
TABLET ORAL
Qty: 1 TABLET | Refills: 0 | OUTPATIENT
Start: 2019-02-18 | End: 2020-01-24

## 2019-02-21 ENCOUNTER — APPOINTMENT (OUTPATIENT)
Dept: MRI IMAGING | Facility: HOSPITAL | Age: 54
End: 2019-02-21

## 2019-03-01 ENCOUNTER — APPOINTMENT (OUTPATIENT)
Dept: MRI IMAGING | Facility: HOSPITAL | Age: 54
End: 2019-03-01

## 2019-03-07 ENCOUNTER — APPOINTMENT (OUTPATIENT)
Dept: MRI IMAGING | Facility: HOSPITAL | Age: 54
End: 2019-03-07

## 2019-03-15 ENCOUNTER — APPOINTMENT (OUTPATIENT)
Dept: MRI IMAGING | Facility: HOSPITAL | Age: 54
End: 2019-03-15

## 2019-03-20 RX ORDER — AMLODIPINE BESYLATE 10 MG/1
10 TABLET ORAL DAILY
Qty: 30 TABLET | Refills: 2 | Status: SHIPPED | OUTPATIENT
Start: 2019-03-20 | End: 2019-06-15 | Stop reason: SDUPTHER

## 2019-03-25 ENCOUNTER — APPOINTMENT (OUTPATIENT)
Dept: MRI IMAGING | Facility: HOSPITAL | Age: 54
End: 2019-03-25

## 2019-06-13 ENCOUNTER — TRANSCRIBE ORDERS (OUTPATIENT)
Dept: ADMINISTRATIVE | Facility: HOSPITAL | Age: 54
End: 2019-06-13

## 2019-06-13 DIAGNOSIS — Z87.440 HISTORY OF UTI: Primary | ICD-10-CM

## 2019-06-17 RX ORDER — AMLODIPINE BESYLATE 10 MG/1
10 TABLET ORAL DAILY
Qty: 30 TABLET | Refills: 0 | Status: SHIPPED | OUTPATIENT
Start: 2019-06-17 | End: 2019-07-16 | Stop reason: SDUPTHER

## 2019-06-18 ENCOUNTER — HOSPITAL ENCOUNTER (OUTPATIENT)
Dept: ULTRASOUND IMAGING | Facility: HOSPITAL | Age: 54
Discharge: HOME | End: 2019-06-18
Admitting: UROLOGY

## 2019-06-18 DIAGNOSIS — Z87.440 HISTORY OF UTI: ICD-10-CM

## 2019-06-18 PROCEDURE — 76775 US EXAM ABDO BACK WALL LIM: CPT

## 2019-07-18 NOTE — TELEPHONE ENCOUNTER
Received a refill request for pt's Amlodipine 10 mg qd.  Pt's lov 05/18/18.  You wanted pt to follow up with Dr. Cano in a yr (she has not seen Dr. Cano yet).  No pcp is listed for pt.  I have attempted to contact pt, but had to leave a vm.  Do you want to fill for 30 days? Please see pending rx.

## 2019-07-22 RX ORDER — AMLODIPINE BESYLATE 10 MG/1
10 TABLET ORAL DAILY
Qty: 30 TABLET | Refills: 0 | OUTPATIENT
Start: 2019-07-22 | End: 2021-01-24

## 2019-07-22 NOTE — TELEPHONE ENCOUNTER
Please give her a 30-day prescription.  Please send her a letter to call to make an appointment so we can continue to refill her medications.  Thank you

## 2019-11-30 ENCOUNTER — HOSPITAL ENCOUNTER (EMERGENCY)
Facility: HOSPITAL | Age: 54
Discharge: HOME | End: 2019-11-30
Attending: EMERGENCY MEDICINE | Admitting: EMERGENCY MEDICINE

## 2019-11-30 VITALS
BODY MASS INDEX: 19.7 KG/M2 | TEMPERATURE: 97.9 F | HEART RATE: 85 BPM | OXYGEN SATURATION: 99 % | RESPIRATION RATE: 16 BRPM | HEIGHT: 68 IN | WEIGHT: 130 LBS | SYSTOLIC BLOOD PRESSURE: 159 MMHG | DIASTOLIC BLOOD PRESSURE: 92 MMHG

## 2019-11-30 DIAGNOSIS — H10.32 ACUTE CONJUNCTIVITIS OF LEFT EYE, UNSPECIFIED ACUTE CONJUNCTIVITIS TYPE: Primary | ICD-10-CM

## 2019-11-30 PROCEDURE — 99283 EMERGENCY DEPT VISIT LOW MDM: CPT

## 2019-11-30 RX ORDER — OFLOXACIN 3 MG/ML
1 SOLUTION/ DROPS OPHTHALMIC 4 TIMES DAILY
Qty: 10 ML | Refills: 0 | OUTPATIENT
Start: 2019-11-30 | End: 2021-01-24

## 2019-11-30 RX ORDER — OFLOXACIN 3 MG/ML
2 SOLUTION/ DROPS OPHTHALMIC ONCE
Status: COMPLETED | OUTPATIENT
Start: 2019-11-30 | End: 2019-11-30

## 2019-11-30 RX ORDER — TETRACAINE HYDROCHLORIDE 5 MG/ML
2 SOLUTION OPHTHALMIC ONCE
Status: COMPLETED | OUTPATIENT
Start: 2019-11-30 | End: 2019-11-30

## 2019-11-30 RX ADMIN — Medication 1 STRIP: at 07:35

## 2019-11-30 RX ADMIN — TETRACAINE HYDROCHLORIDE 2 DROP: 5 SOLUTION OPHTHALMIC at 07:35

## 2019-11-30 RX ADMIN — OFLOXACIN 2 DROP: 3 SOLUTION/ DROPS OPHTHALMIC at 08:17

## 2020-05-26 ENCOUNTER — APPOINTMENT (OUTPATIENT)
Dept: WOMENS IMAGING | Facility: HOSPITAL | Age: 55
End: 2020-05-26

## 2020-05-26 PROCEDURE — 77067 SCR MAMMO BI INCL CAD: CPT | Performed by: RADIOLOGY

## 2020-09-14 DIAGNOSIS — M50.120 CERVICAL DISC DISORDER WITH RADICULOPATHY OF MID-CERVICAL REGION: Primary | ICD-10-CM

## 2020-09-14 DIAGNOSIS — G89.29 CHRONIC BILATERAL LOW BACK PAIN WITH LEFT-SIDED SCIATICA: ICD-10-CM

## 2020-09-14 DIAGNOSIS — M54.42 CHRONIC BILATERAL LOW BACK PAIN WITH LEFT-SIDED SCIATICA: ICD-10-CM

## 2020-09-14 NOTE — PROGRESS NOTES
Per Dr GREEN -orders for MRI cervical/MRIlumbar/cervical spine ap/lat with flex/ext and lumbar spine ap/lat with flex/ext put in chart.  She will call back after scheduling imaging to get on Dr GREEN's schedule for a tele-visit per him.

## 2020-10-12 ENCOUNTER — HOSPITAL ENCOUNTER (OUTPATIENT)
Dept: MRI IMAGING | Facility: HOSPITAL | Age: 55
Discharge: HOME | End: 2020-10-12

## 2020-10-12 ENCOUNTER — HOSPITAL ENCOUNTER (OUTPATIENT)
Dept: GENERAL RADIOLOGY | Facility: HOSPITAL | Age: 55
Discharge: HOME | End: 2020-10-12

## 2020-10-12 DIAGNOSIS — M54.42 CHRONIC BILATERAL LOW BACK PAIN WITH LEFT-SIDED SCIATICA: ICD-10-CM

## 2020-10-12 DIAGNOSIS — G89.29 CHRONIC BILATERAL LOW BACK PAIN WITH LEFT-SIDED SCIATICA: ICD-10-CM

## 2020-10-12 DIAGNOSIS — M50.120 CERVICAL DISC DISORDER WITH RADICULOPATHY OF MID-CERVICAL REGION: ICD-10-CM

## 2020-10-12 PROCEDURE — 72156 MRI NECK SPINE W/O & W/DYE: CPT

## 2020-10-12 PROCEDURE — 72114 X-RAY EXAM L-S SPINE BENDING: CPT

## 2020-10-12 PROCEDURE — 72050 X-RAY EXAM NECK SPINE 4/5VWS: CPT

## 2020-10-12 PROCEDURE — 82565 ASSAY OF CREATININE: CPT

## 2020-10-12 PROCEDURE — 0 GADOBENATE DIMEGLUMINE 529 MG/ML SOLUTION: Performed by: NURSE PRACTITIONER

## 2020-10-12 PROCEDURE — A9577 INJ MULTIHANCE: HCPCS | Performed by: NURSE PRACTITIONER

## 2020-10-12 PROCEDURE — 72158 MRI LUMBAR SPINE W/O & W/DYE: CPT

## 2020-10-12 RX ADMIN — GADOBENATE DIMEGLUMINE 12 ML: 529 INJECTION, SOLUTION INTRAVENOUS at 18:45

## 2020-10-13 LAB — CREAT BLDA-MCNC: 1 MG/DL (ref 0.6–1.3)

## 2020-11-16 ENCOUNTER — OFFICE VISIT (OUTPATIENT)
Dept: NEUROSURGERY | Facility: CLINIC | Age: 55
End: 2020-11-16

## 2020-11-16 VITALS
SYSTOLIC BLOOD PRESSURE: 173 MMHG | HEIGHT: 55 IN | HEART RATE: 90 BPM | WEIGHT: 130 LBS | BODY MASS INDEX: 30.09 KG/M2 | DIASTOLIC BLOOD PRESSURE: 104 MMHG | TEMPERATURE: 98.1 F

## 2020-11-16 DIAGNOSIS — M96.1 POSTLAMINECTOMY SYNDROME, LUMBAR REGION: Primary | ICD-10-CM

## 2020-11-16 DIAGNOSIS — M50.122 CERVICAL DISC DISORDER AT C5-C6 LEVEL WITH RADICULOPATHY: ICD-10-CM

## 2020-11-16 DIAGNOSIS — M50.123 CERVICAL DISC DISORDER AT C6-C7 LEVEL WITH RADICULOPATHY: ICD-10-CM

## 2020-11-16 PROCEDURE — 99214 OFFICE O/P EST MOD 30 MIN: CPT | Performed by: NEUROLOGICAL SURGERY

## 2020-11-16 NOTE — PROGRESS NOTES
Subjective   History of Present Illness: Tatiana Reynolds is a 55 y.o. female is here today for Lumbar MRI and Cervical  MRI follow-up.     Patient was last seen 2.8.2019 for increase low back pain and left thigh and buttock pain. Left leg weakness and N/T.    Patient had MRI lumbar spine, MRI cervical spine, XR cervical spine, lumbar spine 10.12.2020.    Patient reports today neck pain and low back pain. She reports that there is no pain in her legs She reports that she gets shooting pain down her R arm. She also states that when she lays down at night her feet will pull. Patient states that the ablation does help her with pain when she has them done.    Patient is currently taking Oxycodone 30 mg QID for pain.    It has been about a year and a half since I have seen her. It finally took all this time to get the pictures. She has had multiple injections in the neck and the back. She has pain in the neck and the right arm and no weakness and in the low back it is pain in the low back and both legs. She has had a thoracic discectomy at T8-T9 and 2 lumbar surgeries at L4-L5 on the right. She has had a surgery on the left at L5-S1. She continues to have back and bilateral leg pain. I reviewed the MRIs which did not show anything of great significance other than epidural scar tissue and postoperative changes at L4-L5 and L5-S1. She has already talked to Dr. Riley about a spinal cord stimulator and has in fact taken a neuropsychological evaluation. I think that is a good idea for the low back. I would not recommend a fusion or any other reoperation. The neck is a different story, however. She does have some osteophytic disease particularly on the right at C5-C6, and given that she has exhausted conservative treatment there, an ACDF at C5-C6 and C6-C7 with cages and plates is not unreasonable. She thinks she wants to pursue that but probably not until next year. She gets pain medications from Dr. Riley and I told her  "we would provide the first 1 or 2 prescriptions after surgery and then she could go back and get whatever medicine she needs from Dr. Riley. 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 She will let us know when she would like to proceed with the surgical surgery and will speak to Dr. Riley about the stimulator.    She needs to get back to work 6 weeks after surgery in the OR. We should be able to accomodate this.    Back pain    Back Pain  This is a recurrent problem. The problem occurs constantly. The pain is present in the lumbar spine. The quality of the pain is described as burning. The pain is at a severity of 6/10. The pain is moderate. The pain is worse during the day. The symptoms are aggravated by sitting. Associated symptoms include numbness and tingling. Pertinent negatives include no bladder incontinence, bowel incontinence, fever or leg pain. (R arm) The treatment provided mild relief.       The following portions of the patient's history were reviewed and updated as appropriate: allergies, current medications, past family history, past medical history, past social history, past surgical history and problem list.    Review of Systems   Constitutional: Negative for chills and fever.   HENT: Negative for congestion.    Gastrointestinal: Negative for bowel incontinence.   Genitourinary: Negative for bladder incontinence and difficulty urinating.   Musculoskeletal: Positive for back pain. Negative for gait problem and joint swelling.   Neurological: Positive for tingling and numbness.   All other systems reviewed and are negative.          Objective     Vitals:    11/16/20 1505   BP: (!) 173/104   Cuff Size: Adult   Pulse: 90   Temp: 98.1 °F (36.7 °C)   Weight: 59 kg (130 lb)   Height: 68 cm (26.77\")     Body mass index is 127.56 kg/m².      Physical Exam  Constitutional:       Appearance: She is well-developed.   HENT:      Head: " Normocephalic and atraumatic.   Eyes:      Extraocular Movements: EOM normal.      Conjunctiva/sclera: Conjunctivae normal.      Pupils: Pupils are equal, round, and reactive to light.      Funduscopic exam:     Right eye: No papilledema.         Left eye: No papilledema.   Neck:      Vascular: No carotid bruit.   Neurological:      Mental Status: She is oriented to person, place, and time.      Coordination: Finger-Nose-Finger Test and Heel to Shin Test normal.      Gait: Gait is intact.      Deep Tendon Reflexes:      Reflex Scores:       Tricep reflexes are 2+ on the right side and 2+ on the left side.       Bicep reflexes are 2+ on the right side and 2+ on the left side.       Brachioradialis reflexes are 2+ on the right side and 2+ on the left side.       Patellar reflexes are 2+ on the right side and 2+ on the left side.       Achilles reflexes are 2+ on the right side and 2+ on the left side.  Psychiatric:         Speech: Speech normal.       Neurologic Exam     Mental Status   Oriented to person, place, and time.   Registration of memory: Good recent and remote memory.   Attention: normal. Concentration: normal.   Speech: speech is normal   Level of consciousness: alert  Knowledge: consistent with education.     Cranial Nerves     CN II   Visual fields full to confrontation.   Visual acuity: normal    CN III, IV, VI   Pupils are equal, round, and reactive to light.  Extraocular motions are normal.     CN V   Facial sensation intact.   Right corneal reflex: normal  Left corneal reflex: normal    CN VII   Facial expression full, symmetric.   Right facial weakness: none  Left facial weakness: none    CN VIII   Hearing: intact    CN IX, X   Palate: symmetric    CN XI   Right sternocleidomastoid strength: normal  Left sternocleidomastoid strength: normal    CN XII   Tongue: not atrophic  Tongue deviation: none    Motor Exam   Muscle bulk: normal  Right arm tone: normal  Left arm tone: normal  Right leg tone:  normal  Left leg tone: normal    Strength   Strength 5/5 except as noted.     Sensory Exam   Light touch normal.     Gait, Coordination, and Reflexes     Gait  Gait: normal    Coordination   Finger to nose coordination: normal  Heel to shin coordination: normal    Reflexes   Right brachioradialis: 2+  Left brachioradialis: 2+  Right biceps: 2+  Left biceps: 2+  Right triceps: 2+  Left triceps: 2+  Right patellar: 2+  Left patellar: 2+  Right achilles: 2+  Left achilles: 2+  Right : 2+  Left : 2+          Assessment/Plan   Independent Review of Radiographic Studies:      I personally reviewed the images from the following studies.    I reviewed the lumbar MRI done on 10/12/2020 which shows postoperative changes at L4-L5 and L5-S1 and some epidural scar tissue but no significant stenosis or recurrent disc herniation or instability.  The cervical MRI that was done also on 10/12/2020 shows osteophytic cervical disc disease most significantly at C5-C6 with bilateral root compression right worse than left and osteophytic disease at C6-C7 compressing the C7 root C4-C5 and C7-T1 left fairly open.  Agree with the report.        Medical Decision Making:      As far as the low back goes she can speak with  proceeding with a spinal cord stimulator.  As far as the neck is concerned, I described and recommended an anterior cervical discectomy and fusion with a cage and plate at C5-C6 and C6-C7.. The goal of surgery is relief of radiating arm pain and improvement of numbness, tingling, and weakness. This will help reduce but may not eliminate midline neck pain. The risks include, but are not limited to, infection, hemorrhage requiring transfusion or reoperation, CSF leak requiring reoperation, incomplete relief of symptoms, difficulty swallowing, hoarseness of voice, hardware problems requiring revision surgery, potential need for additional surgery in the future, stroke, paralysis, coma, and death. The patient  would like to think about it and will let us know if and when she would like to proceed, probably early next year.      Diagnoses and all orders for this visit:    1. Postlaminectomy syndrome, lumbar region (Primary)    2. Cervical disc disorder at C5-C6 level with radiculopathy    3. Cervical disc disorder at C6-C7 level with radiculopathy      Return for She will let us know if she wants to pursue surgery.

## 2021-01-18 DIAGNOSIS — M50.122 CERVICAL DISC DISORDER AT C5-C6 LEVEL WITH RADICULOPATHY: Primary | ICD-10-CM

## 2021-01-18 DIAGNOSIS — M50.123 CERVICAL DISC DISORDER AT C6-C7 LEVEL WITH RADICULOPATHY: ICD-10-CM

## 2021-01-19 PROBLEM — M50.122 CERVICAL DISC DISORDER AT C5-C6 LEVEL WITH RADICULOPATHY: Status: ACTIVE | Noted: 2021-01-19

## 2021-01-24 ENCOUNTER — HOSPITAL ENCOUNTER (EMERGENCY)
Facility: HOSPITAL | Age: 56
Discharge: HOME | End: 2021-01-24
Attending: EMERGENCY MEDICINE | Admitting: EMERGENCY MEDICINE

## 2021-01-24 VITALS
TEMPERATURE: 96.9 F | OXYGEN SATURATION: 98 % | HEIGHT: 68 IN | WEIGHT: 140 LBS | BODY MASS INDEX: 21.22 KG/M2 | RESPIRATION RATE: 16 BRPM | DIASTOLIC BLOOD PRESSURE: 62 MMHG | SYSTOLIC BLOOD PRESSURE: 112 MMHG | HEART RATE: 56 BPM

## 2021-01-24 DIAGNOSIS — I95.1 ORTHOSTATIC HYPOTENSION: Primary | ICD-10-CM

## 2021-01-24 LAB
ALBUMIN SERPL-MCNC: 3.7 G/DL (ref 3.5–5.2)
ALBUMIN/GLOB SERPL: 1.3 G/DL
ALP SERPL-CCNC: 95 U/L (ref 39–117)
ALT SERPL W P-5'-P-CCNC: 13 U/L (ref 1–33)
ANION GAP SERPL CALCULATED.3IONS-SCNC: 9 MMOL/L (ref 5–15)
AST SERPL-CCNC: 19 U/L (ref 1–32)
BASOPHILS # BLD AUTO: 0.02 10*3/MM3 (ref 0–0.2)
BASOPHILS NFR BLD AUTO: 0.3 % (ref 0–1.5)
BILIRUB SERPL-MCNC: 0.3 MG/DL (ref 0–1.2)
BUN SERPL-MCNC: 14 MG/DL (ref 6–20)
BUN/CREAT SERPL: 11.9 (ref 7–25)
CALCIUM SPEC-SCNC: 9.1 MG/DL (ref 8.6–10.5)
CHLORIDE SERPL-SCNC: 102 MMOL/L (ref 98–107)
CO2 SERPL-SCNC: 28 MMOL/L (ref 22–29)
CREAT SERPL-MCNC: 1.18 MG/DL (ref 0.57–1)
DEPRECATED RDW RBC AUTO: 39 FL (ref 37–54)
EOSINOPHIL # BLD AUTO: 0.21 10*3/MM3 (ref 0–0.4)
EOSINOPHIL NFR BLD AUTO: 2.9 % (ref 0.3–6.2)
ERYTHROCYTE [DISTWIDTH] IN BLOOD BY AUTOMATED COUNT: 12.2 % (ref 12.3–15.4)
GFR SERPL CREATININE-BSD FRML MDRD: 47 ML/MIN/1.73
GLOBULIN UR ELPH-MCNC: 2.8 GM/DL
GLUCOSE SERPL-MCNC: 118 MG/DL (ref 65–99)
HCT VFR BLD AUTO: 33.8 % (ref 34–46.6)
HGB BLD-MCNC: 11.2 G/DL (ref 12–15.9)
IMM GRANULOCYTES # BLD AUTO: 0.02 10*3/MM3 (ref 0–0.05)
IMM GRANULOCYTES NFR BLD AUTO: 0.3 % (ref 0–0.5)
LYMPHOCYTES # BLD AUTO: 2.32 10*3/MM3 (ref 0.7–3.1)
LYMPHOCYTES NFR BLD AUTO: 31.6 % (ref 19.6–45.3)
MCH RBC QN AUTO: 29.3 PG (ref 26.6–33)
MCHC RBC AUTO-ENTMCNC: 33.1 G/DL (ref 31.5–35.7)
MCV RBC AUTO: 88.5 FL (ref 79–97)
MONOCYTES # BLD AUTO: 0.55 10*3/MM3 (ref 0.1–0.9)
MONOCYTES NFR BLD AUTO: 7.5 % (ref 5–12)
NEUTROPHILS NFR BLD AUTO: 4.22 10*3/MM3 (ref 1.7–7)
NEUTROPHILS NFR BLD AUTO: 57.4 % (ref 42.7–76)
NRBC BLD AUTO-RTO: 0 /100 WBC (ref 0–0.2)
PLATELET # BLD AUTO: 223 10*3/MM3 (ref 140–450)
PMV BLD AUTO: 10 FL (ref 6–12)
POTASSIUM SERPL-SCNC: 3.7 MMOL/L (ref 3.5–5.2)
PROT SERPL-MCNC: 6.5 G/DL (ref 6–8.5)
QT INTERVAL: 459 MS
RBC # BLD AUTO: 3.82 10*6/MM3 (ref 3.77–5.28)
SODIUM SERPL-SCNC: 139 MMOL/L (ref 136–145)
TROPONIN T SERPL-MCNC: <0.01 NG/ML (ref 0–0.03)
WBC # BLD AUTO: 7.34 10*3/MM3 (ref 3.4–10.8)

## 2021-01-24 PROCEDURE — 84484 ASSAY OF TROPONIN QUANT: CPT | Performed by: EMERGENCY MEDICINE

## 2021-01-24 PROCEDURE — 93005 ELECTROCARDIOGRAM TRACING: CPT | Performed by: EMERGENCY MEDICINE

## 2021-01-24 PROCEDURE — 93010 ELECTROCARDIOGRAM REPORT: CPT | Performed by: INTERNAL MEDICINE

## 2021-01-24 PROCEDURE — 85025 COMPLETE CBC W/AUTO DIFF WBC: CPT | Performed by: EMERGENCY MEDICINE

## 2021-01-24 PROCEDURE — 99284 EMERGENCY DEPT VISIT MOD MDM: CPT

## 2021-01-24 PROCEDURE — 80053 COMPREHEN METABOLIC PANEL: CPT | Performed by: EMERGENCY MEDICINE

## 2021-01-24 RX ORDER — SODIUM CHLORIDE 0.9 % (FLUSH) 0.9 %
10 SYRINGE (ML) INJECTION AS NEEDED
Status: DISCONTINUED | OUTPATIENT
Start: 2021-01-24 | End: 2021-01-24 | Stop reason: HOSPADM

## 2021-01-24 RX ORDER — MORPHINE SULFATE 15 MG/1
15 TABLET, FILM COATED, EXTENDED RELEASE ORAL 2 TIMES DAILY
COMMUNITY

## 2021-01-24 RX ADMIN — SODIUM CHLORIDE 1000 ML: 9 INJECTION, SOLUTION INTRAVENOUS at 08:59

## 2021-01-24 RX ADMIN — SODIUM CHLORIDE 1000 ML: 9 INJECTION, SOLUTION INTRAVENOUS at 09:49

## 2021-01-24 NOTE — DISCHARGE INSTRUCTIONS
Stop metoprolol and Norvasc.  Rest at home.  Check your blood pressure-if the top number is consistently less than 100 and return to the emergency department.  Otherwise you may restart Norvasc in 24 to 48 hours if the top number is greater than 150.  Follow-up with [facility].

## 2021-01-24 NOTE — ED TRIAGE NOTES
Pt states that she got up this morning and felt fine. Came to work and started to feel light headed and faint. States that she took her BP and that it was low in the 80's systolic. Pt states that she is on HTN medications but that she has not taken them today. Patient masked at arrival and triage staff wore all appropriate PPE during entire encounter with patient.

## 2021-01-24 NOTE — ED NOTES
Patient was placed in face mask in first look.  Patient was wearing a face mask throughout our encounter.  I wore protective eye protection throughout the encounter.  Hand hygiene was performed before and after patient encounter.        Luis Gutierrez RN  01/24/21 0888

## 2021-01-24 NOTE — ED PROVIDER NOTES
EMERGENCY DEPARTMENT ENCOUNTER    Room Number:  16/16  Date of encounter:  1/24/2021  PCP: Provider, No Known  Historian: Patient      HPI:  Chief Complaint: Near syncope  A complete HPI/ROS/PMH/PSH/SH/FH are unobtainable due to: N/A    Context: Tatiana Reynolds is a 56 y.o. female who presents to the ED c/o a near syncopal event today.  She is a scrub tech upstairs, and she states she felt fine when she got to work this morning.  She was talking to a coworker and all of a sudden began feeling lightheaded and dizzy.  She did not actually pass out.  She did become diaphoretic.  Systolic blood pressure was in the 80s.  No trauma.  She feels slightly better at the present time.  She has had no recent change in her routine.  Blood pressure was actually up a few days ago (190/107).  No recent fevers, chills, cough, congestion, chest pain, nausea, vomiting, diarrhea or abdominal pain.  She has chronic neck and low back pain managed by pain management-no change in her dosage or timing of her medications.      The patient was placed in a mask in triage, hand hygiene was performed before and after my interaction with the patient.  I wore a mask, safety glasses and gloves during my entire interaction with the patient.    PAST MEDICAL HISTORY  Active Ambulatory Problems     Diagnosis Date Noted   • Cervical disc disorder at C6-C7 level with radiculopathy 08/05/2016   • Degeneration of lumbar intervertebral disc 08/05/2016   • Chronic bilateral low back pain with left-sided sciatica 02/08/2019   • Chronic neck pain 02/08/2019   • Postlaminectomy syndrome, lumbar region 11/16/2020   • Cervical disc disorder at C5-C6 level with radiculopathy 01/19/2021     Resolved Ambulatory Problems     Diagnosis Date Noted   • No Resolved Ambulatory Problems     Past Medical History:   Diagnosis Date   • Anxiety    • Arthritis    • Chronic back pain    • Diabetes mellitus (CMS/[facility])    • Essential hypertension    • Hyperlipidemia    • Injury  of back    • Migraine    • Palpitations    • Precordial pain    • Shortness of breath          PAST SURGICAL HISTORY  Past Surgical History:   Procedure Laterality Date   • BACK SURGERY      lumbar X 2   • BREAST AUGMENTATION Bilateral    •  SECTION     • DILATATION AND CURETTAGE     • FOOT SURGERY Right     bunionectormy, x2, bunion and 2nd toe   • LUMBAR SPINE SURGERY     • SHOULDER SURGERY      slap tear repair   • THORACIC SPINE SURGERY           FAMILY HISTORY  Family History   Problem Relation Age of Onset   • Diabetes Mother    • Hyperlipidemia Mother    • Cancer Brother         COLON   • Hypertension Brother    • Heart disease Brother    • Hypertension Brother          SOCIAL HISTORY  Social History     Socioeconomic History   • Marital status: Single     Spouse name: Not on file   • Number of children: 1   • Years of education: COLLEGE   • Highest education level: Not on file   Occupational History   • Occupation: SURGICAL TECH/ASSISTANT    Tobacco Use   • Smoking status: Former Smoker     Packs/day: 0.50     Types: Cigarettes   • Tobacco comment: started at age 14; quit on 2017   Substance and Sexual Activity   • Alcohol use: No     Comment: Very Little caffeine use   • Drug use: No     Types: Oxycodone     Comment: oxycodone prescribed by physician   • Sexual activity: Defer         ALLERGIES  Patient has no known allergies.        REVIEW OF SYSTEMS  Review of Systems   Constitutional: Positive for diaphoresis. Negative for chills and fever.   Respiratory: Negative for shortness of breath.    Cardiovascular: Negative for chest pain and palpitations.   Gastrointestinal: Negative for abdominal pain, blood in stool, diarrhea, nausea and vomiting.   Musculoskeletal: Positive for back pain (Chronic, unchanged) and neck pain (Chronic, unchanged).   Neurological: Positive for dizziness, syncope and light-headedness.        All systems reviewed and negative except for those discussed in HPI.        PHYSICAL EXAM    I have reviewed the triage vital signs and nursing notes.    ED Triage Vitals [01/24/21 0833]   Temp Heart Rate Resp BP SpO2   96.9 °F (36.1 °C) 80 18 -- 95 %      Temp src Heart Rate Source Patient Position BP Location FiO2 (%)   Tympanic Monitor -- -- --       Physical Exam   Constitutional: Pt. is oriented to person, place, and time and well-developed, well-nourished, and in no distress.  HENT: Normocephalic and atraumatic,  EOM are normal. Pupils are equal, round, and reactive to light.  Neck:  Neck supple. No JVD present. No tracheal deviation present. Cardiovascular: Normal rate, regular rhythm and normal heart sounds. Exam reveals no gallop and no friction rub.   No murmur heard.  Pulmonary/Chest: Effort normal and breath sounds normal. No stridor. No respiratory distress. No wheezes, no rales.   Abdominal: Soft. Bowel sounds are normal. No distension. There is no tenderness. There is no rebound and no guarding.   Musculoskeletal: No edema, tenderness or deformity.   Neurological: Pt. is alert and oriented to person, place, and time.  No focal deficits noted.  GCS 15.  Skin: Skin is warm and dry. No rash noted. Pt. is not diaphoretic. No erythema.   Psychiatric: Mood, affect and judgment normal.  She is pleasant and cooperative.  Nursing note and vitals reviewed.        LAB RESULTS  Recent Results (from the past 24 hour(s))   Comprehensive Metabolic Panel    Collection Time: 01/24/21  8:57 AM    Specimen: Blood   Result Value Ref Range    Glucose 118 (H) 65 - 99 mg/dL    BUN 14 6 - 20 mg/dL    Creatinine 1.18 (H) 0.57 - 1.00 mg/dL    Sodium 139 136 - 145 mmol/L    Potassium 3.7 3.5 - 5.2 mmol/L    Chloride 102 98 - 107 mmol/L    CO2 28.0 22.0 - 29.0 mmol/L    Calcium 9.1 8.6 - 10.5 mg/dL    Total Protein 6.5 6.0 - 8.5 g/dL    Albumin 3.70 3.50 - 5.20 g/dL    ALT (SGPT) 13 1 - 33 U/L    AST (SGOT) 19 1 - 32 U/L    Alkaline Phosphatase 95 39 - 117 U/L    Total Bilirubin 0.3 0.0 - 1.2  mg/dL    eGFR Non African Amer 47 (L) >60 mL/min/1.73    Globulin 2.8 gm/dL    A/G Ratio 1.3 g/dL    BUN/Creatinine Ratio 11.9 7.0 - 25.0    Anion Gap 9.0 5.0 - 15.0 mmol/L   Troponin    Collection Time: 01/24/21  8:57 AM    Specimen: Blood   Result Value Ref Range    Troponin T <0.010 0.000 - 0.030 ng/mL   CBC Auto Differential    Collection Time: 01/24/21  8:57 AM    Specimen: Blood   Result Value Ref Range    WBC 7.34 3.40 - 10.80 10*3/mm3    RBC 3.82 3.77 - 5.28 10*6/mm3    Hemoglobin 11.2 (L) 12.0 - 15.9 g/dL    Hematocrit 33.8 (L) 34.0 - 46.6 %    MCV 88.5 79.0 - 97.0 fL    MCH 29.3 26.6 - 33.0 pg    MCHC 33.1 31.5 - 35.7 g/dL    RDW 12.2 (L) 12.3 - 15.4 %    RDW-SD 39.0 37.0 - 54.0 fl    MPV 10.0 6.0 - 12.0 fL    Platelets 223 140 - 450 10*3/mm3    Neutrophil % 57.4 42.7 - 76.0 %    Lymphocyte % 31.6 19.6 - 45.3 %    Monocyte % 7.5 5.0 - 12.0 %    Eosinophil % 2.9 0.3 - 6.2 %    Basophil % 0.3 0.0 - 1.5 %    Immature Grans % 0.3 0.0 - 0.5 %    Neutrophils, Absolute 4.22 1.70 - 7.00 10*3/mm3    Lymphocytes, Absolute 2.32 0.70 - 3.10 10*3/mm3    Monocytes, Absolute 0.55 0.10 - 0.90 10*3/mm3    Eosinophils, Absolute 0.21 0.00 - 0.40 10*3/mm3    Basophils, Absolute 0.02 0.00 - 0.20 10*3/mm3    Immature Grans, Absolute 0.02 0.00 - 0.05 10*3/mm3    nRBC 0.0 0.0 - 0.2 /100 WBC   ECG 12 Lead    Collection Time: 01/24/21  8:57 AM   Result Value Ref Range    QT Interval 459 ms       Ordered the above labs and independently reviewed the results.        RADIOLOGY  No Radiology Exams Resulted Within Past 24 Hours    I ordered the above noted radiological studies. Reviewed by me and discussed with radiologist.  See dictation for official radiology interpretation.      PROCEDURES    Procedures      MEDICATIONS GIVEN IN ER    Medications   sodium chloride 0.9 % flush 10 mL (has no administration in time range)   sodium chloride 0.9 % bolus 1,000 mL (0 mL Intravenous Stopped 1/24/21 1052)   sodium chloride 0.9 % bolus 1,000  mL (0 mL Intravenous Stopped 1/24/21 1053)         PROGRESS, DATA ANALYSIS, CONSULTS, AND MEDICAL DECISION MAKING    Any/all labs have been independently reviewed by me.  Any/all radiology studies have been reviewed by me and discussed with radiologist dictating the report.   EKG's independently viewed and interpreted by me.  Discussion below represents my analysis of pertinent findings related to patient's condition, differential diagnosis, treatment plan and final disposition.      ED Course as of Jan 24 1100   Sun Jan 24, 2021   0902 EKG performed at 08 57 and interpreted by me shows a normal sinus rhythm with a heart rate of 55 bpm.  CT interval's, QRS complexes and ST-T segments are unremarkable.  The rate has improved when compared to 6/24/2017.    [WC]   0909 She is markedly orthostatic.    [WC]   0920 WBC: 7.34 [WC]   0920 Hemoglobin(!): 11.2 [WC]   0920 Hematocrit(!): 33.8 [WC]   0920 Platelets: 223 [WC]   0941 Troponin T: <0.010 [WC]   0941 BUN: 14 [WC]   0941 Creatinine(!): 1.18 [WC]   0941 Sodium: 139 [WC]   0941 Potassium: 3.7 [WC]   1052  she states she feels much better after 2 L of IV fluids.  She still a little bit dizzy when she stands up, but her systolic is 112.  She does not really want to stay in the hospital because she takes care of her elderly parent.  We discussed her medication regimen-she will stop metoprolol for the time being.  She will take her blood pressure at home and restart Norvasc at 5 mg for systolic greater than 150.  I advised her to return to the emergency department if she has any more dizziness or lightheadedness or if her systolic blood pressure is less than 100.  She is agreeable with this plan.    [WC]      ED Course User Index  [WC] Wolfgang Coffey MD       AS OF 11:00 EST VITALS:    BP - 112/62  HR - 60  TEMP - 96.9 °F (36.1 °C) (Tympanic)  02 SATS - 96%        DIAGNOSIS  Final diagnoses:   Orthostatic hypotension         DISPOSITION  Discharged           Alexx  MD Wolfgang  01/24/21 3552

## 2021-01-25 ENCOUNTER — EPISODE CHANGES (OUTPATIENT)
Dept: CASE MANAGEMENT | Facility: OTHER | Age: 56
End: 2021-01-25

## 2021-01-25 ENCOUNTER — TRANSCRIBE ORDERS (OUTPATIENT)
Dept: PREADMISSION TESTING | Facility: HOSPITAL | Age: 56
End: 2021-01-25

## 2021-01-25 DIAGNOSIS — Z01.818 OTHER SPECIFIED PRE-OPERATIVE EXAMINATION: Primary | ICD-10-CM

## 2021-01-26 ENCOUNTER — PATIENT OUTREACH (OUTPATIENT)
Dept: CASE MANAGEMENT | Facility: OTHER | Age: 56
End: 2021-01-26

## 2021-01-26 NOTE — OUTREACH NOTE
Care Coordination Assessment    Documented/Reviewed By: Anjali Ballard RN Date/time: 1/26/2021 10:11 AM   Assessment completed with: patient  Enrolled in care management program: Yes  Living arrangement: alone  Support system: family, friends, children  Home care services: No  Equipment used at home: none  Communication device: No  Bed or wheelchair confined: No  Inadequate nutrition: No  Medication adherence problem: No  Experiencing side effects from current medications: No  History of fall(s) in last 6 months: No  Difficulty keeping appointments: No  Family aware of the patient's advance care planning wishes:  (Comment: will address on future all )  Islam or spiritual beliefs that impact treatment: No  Chronic pain: No       Care Plan Note      Responses   Lifestyle Goals  Medication management, Self monitor blood pressure, Fewer exacerbations   Barriers  Disease education   Self Management  Medication Adherence, Home BP Monitoring   Annual Wellness Visit:   Patient Will Schedule   Care Gap Comments  Blood pressure    Specific Disease Process Teaching  Hypertension   Other Patient Education/Resources   [facility] Nurse Call Line   Does patient have depression diagnosis?  No   Advanced Directives:  -- [will address on future all ]   Ed Visits past 12 months:  1   Hospitalizations past 12 months  None   Goal Progress  Making Progress Toward Goal(s)   Readiness Scale  10   Confidence Scale  10   How often do you have someone help you read hospital materials?  Never   How often do you have problems learning about your medical condition because of difficulty understanding written information?  Never   How often do you have a problem understanding what is told to you about your medical condition?  Never   How confident are you filling out medical forms by yourself?  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  I contacted Tatiana after she presented to the ED with dizziness and very low BP while at work.  She had a reading as low as 67/39.  They took her off her BP medication and have diagnosed her with Orthostatic hypertension. They wanted to keep her overnight and admit her but she cares for her elderly mother and was afraid to leave her.  She is monitoring her BP and has been told to go back to the hospital if her Systolic goes below 100.        Education/instruction provided by Care Coordinator:   This has been a recent ongoing problem for Tatiana and she is being followed by her PCP.  They have taken her off her BP medications.  We discussed the importance of drinking plenty of fluids.  She was not dehydrated when she reported to the ED.  She is scheduled for neck surgery on 2/3.  Stressed the importance of her calling her surgeon ASAP and letting them know that this episode occurred.  Educated on how dangerous it could be if her BP was uncontrolled during her procedure.  We also discussed other entities that may affect BP including pain meds & sinus medications that include pseudoephedrine or antihistamines.  Patient verbalized understanding.      I reminded her she is due for her annual physical and advised her I would remind her again after her upcoming neck surgery.      Educated on availability of nurseline and its use.  All basic needs are met. No issue with social determinants.  No inabilities to obtain food or medications or transportation to MD appointments. Educated on participating in habits that prevent the spread of COVID virus with home & work hygiene. Patient verbalizes understanding.  Educated patient on benefits of Employee CM program and invited to call with any new needs.     I have sent a text with the [facility] nurseline and sent a flyer.  I also sent my contact information to her [facility] email and invited her to contact me with new needs.    Follow Up Outreach Due: Check in after neck  surgery on feb 2. Will be int for 1-2days.  BP? Needs to schedule annual physical     Anjali Ballard, RN  Ambulatory     1/26/2021, 12:02 EST  Anjali MIDDLETONN, RN, [facility]   RN Case Manager  [address] [address]     [phone] cell   [phone] office  [phone] fax  [email or web address]

## 2021-01-28 ENCOUNTER — APPOINTMENT (OUTPATIENT)
Dept: PREADMISSION TESTING | Facility: HOSPITAL | Age: 56
End: 2021-01-28

## 2021-01-29 ENCOUNTER — APPOINTMENT (OUTPATIENT)
Dept: PREADMISSION TESTING | Facility: HOSPITAL | Age: 56
End: 2021-01-29

## 2021-01-29 ENCOUNTER — HOSPITAL ENCOUNTER (OUTPATIENT)
Dept: GENERAL RADIOLOGY | Facility: HOSPITAL | Age: 56
Discharge: HOME | End: 2021-01-29

## 2021-01-29 LAB
ABO GROUP BLD: NORMAL
ANION GAP SERPL CALCULATED.3IONS-SCNC: 13.5 MMOL/L (ref 5–15)
APTT PPP: 31.2 SECONDS (ref 22.7–35.4)
BACTERIA UR QL AUTO: ABNORMAL /HPF
BASOPHILS # BLD AUTO: 0.06 10*3/MM3 (ref 0–0.2)
BASOPHILS NFR BLD AUTO: 0.7 % (ref 0–1.5)
BILIRUB UR QL STRIP: NEGATIVE
BLD GP AB SCN SERPL QL: NEGATIVE
BUN SERPL-MCNC: 10 MG/DL (ref 6–20)
BUN/CREAT SERPL: 11 (ref 7–25)
CALCIUM SPEC-SCNC: 10.3 MG/DL (ref 8.6–10.5)
CHLORIDE SERPL-SCNC: 99 MMOL/L (ref 98–107)
CLARITY UR: ABNORMAL
CO2 SERPL-SCNC: 24.5 MMOL/L (ref 22–29)
COLOR UR: ABNORMAL
CREAT SERPL-MCNC: 0.91 MG/DL (ref 0.57–1)
DEPRECATED RDW RBC AUTO: 41.2 FL (ref 37–54)
EOSINOPHIL # BLD AUTO: 0.08 10*3/MM3 (ref 0–0.4)
EOSINOPHIL NFR BLD AUTO: 1 % (ref 0.3–6.2)
ERYTHROCYTE [DISTWIDTH] IN BLOOD BY AUTOMATED COUNT: 12.3 % (ref 12.3–15.4)
GFR SERPL CREATININE-BSD FRML MDRD: 64 ML/MIN/1.73
GLUCOSE SERPL-MCNC: 119 MG/DL (ref 65–99)
GLUCOSE UR STRIP-MCNC: NEGATIVE MG/DL
HCT VFR BLD AUTO: 40.3 % (ref 34–46.6)
HGB BLD-MCNC: 12.7 G/DL (ref 12–15.9)
HGB UR QL STRIP.AUTO: ABNORMAL
HYALINE CASTS UR QL AUTO: ABNORMAL /LPF
IMM GRANULOCYTES # BLD AUTO: 0.03 10*3/MM3 (ref 0–0.05)
IMM GRANULOCYTES NFR BLD AUTO: 0.4 % (ref 0–0.5)
INR PPP: 1.09 (ref 0.9–1.1)
KETONES UR QL STRIP: ABNORMAL
LEUKOCYTE ESTERASE UR QL STRIP.AUTO: NEGATIVE
LYMPHOCYTES # BLD AUTO: 1.46 10*3/MM3 (ref 0.7–3.1)
LYMPHOCYTES NFR BLD AUTO: 17.5 % (ref 19.6–45.3)
MCH RBC QN AUTO: 28.6 PG (ref 26.6–33)
MCHC RBC AUTO-ENTMCNC: 31.5 G/DL (ref 31.5–35.7)
MCV RBC AUTO: 90.8 FL (ref 79–97)
MONOCYTES # BLD AUTO: 0.42 10*3/MM3 (ref 0.1–0.9)
MONOCYTES NFR BLD AUTO: 5 % (ref 5–12)
NEUTROPHILS NFR BLD AUTO: 6.27 10*3/MM3 (ref 1.7–7)
NEUTROPHILS NFR BLD AUTO: 75.4 % (ref 42.7–76)
NITRITE UR QL STRIP: NEGATIVE
NRBC BLD AUTO-RTO: 0 /100 WBC (ref 0–0.2)
PH UR STRIP.AUTO: 6.5 [PH] (ref 5–8)
PLATELET # BLD AUTO: 293 10*3/MM3 (ref 140–450)
PMV BLD AUTO: 9.4 FL (ref 6–12)
POTASSIUM SERPL-SCNC: 4.2 MMOL/L (ref 3.5–5.2)
PROT UR QL STRIP: ABNORMAL
PROTHROMBIN TIME: 13.9 SECONDS (ref 11.7–14.2)
RBC # BLD AUTO: 4.44 10*6/MM3 (ref 3.77–5.28)
RBC # UR: ABNORMAL /HPF
REF LAB TEST METHOD: ABNORMAL
RH BLD: POSITIVE
SODIUM SERPL-SCNC: 137 MMOL/L (ref 136–145)
SP GR UR STRIP: 1.03 (ref 1–1.03)
SQUAMOUS #/AREA URNS HPF: ABNORMAL /HPF
T&S EXPIRATION DATE: NORMAL
UROBILINOGEN UR QL STRIP: ABNORMAL
WBC # BLD AUTO: 8.32 10*3/MM3 (ref 3.4–10.8)
WBC UR QL AUTO: ABNORMAL /HPF

## 2021-01-29 PROCEDURE — 86923 COMPATIBILITY TEST ELECTRIC: CPT

## 2021-01-29 PROCEDURE — 85025 COMPLETE CBC W/AUTO DIFF WBC: CPT

## 2021-01-29 PROCEDURE — 71046 X-RAY EXAM CHEST 2 VIEWS: CPT

## 2021-01-29 PROCEDURE — 81001 URINALYSIS AUTO W/SCOPE: CPT

## 2021-01-29 PROCEDURE — 86850 RBC ANTIBODY SCREEN: CPT

## 2021-01-29 PROCEDURE — 85730 THROMBOPLASTIN TIME PARTIAL: CPT

## 2021-01-29 PROCEDURE — 86901 BLOOD TYPING SEROLOGIC RH(D): CPT

## 2021-01-29 PROCEDURE — 86900 BLOOD TYPING SEROLOGIC ABO: CPT

## 2021-01-29 PROCEDURE — 85610 PROTHROMBIN TIME: CPT

## 2021-01-29 PROCEDURE — 80048 BASIC METABOLIC PNL TOTAL CA: CPT

## 2021-01-29 PROCEDURE — 36415 COLL VENOUS BLD VENIPUNCTURE: CPT

## 2021-01-30 ENCOUNTER — LAB (OUTPATIENT)
Dept: LAB | Facility: HOSPITAL | Age: 56
End: 2021-01-30

## 2021-01-30 DIAGNOSIS — Z01.818 OTHER SPECIFIED PRE-OPERATIVE EXAMINATION: ICD-10-CM

## 2021-01-30 PROCEDURE — C9803 HOPD COVID-19 SPEC COLLECT: HCPCS

## 2021-01-30 PROCEDURE — U0004 COV-19 TEST NON-CDC HGH THRU: HCPCS

## 2021-02-01 ENCOUNTER — ANESTHESIA EVENT (OUTPATIENT)
Dept: PERIOP | Facility: HOSPITAL | Age: 56
End: 2021-02-01

## 2021-02-01 LAB — SARS-COV-2 RNA RESP QL NAA+PROBE: NOT DETECTED

## 2021-02-02 ENCOUNTER — ANESTHESIA (OUTPATIENT)
Dept: PERIOP | Facility: HOSPITAL | Age: 56
End: 2021-02-02

## 2021-02-02 ENCOUNTER — HOSPITAL ENCOUNTER (OUTPATIENT)
Facility: HOSPITAL | Age: 56
Discharge: HOME | End: 2021-02-05
Attending: NEUROLOGICAL SURGERY | Admitting: NEUROLOGICAL SURGERY

## 2021-02-02 ENCOUNTER — APPOINTMENT (OUTPATIENT)
Dept: GENERAL RADIOLOGY | Facility: HOSPITAL | Age: 56
End: 2021-02-02

## 2021-02-02 DIAGNOSIS — R53.1 GENERALIZED WEAKNESS: Primary | ICD-10-CM

## 2021-02-02 PROBLEM — M48.02 CERVICAL STENOSIS OF SPINE: Status: ACTIVE | Noted: 2021-02-02

## 2021-02-02 PROCEDURE — 25010000002 KETOROLAC TROMETHAMINE PER 15 MG: Performed by: NURSE ANESTHETIST, CERTIFIED REGISTERED

## 2021-02-02 PROCEDURE — 25010000002 METHOCARBAMOL 1000 MG/10ML SOLUTION: Performed by: NEUROLOGICAL SURGERY

## 2021-02-02 PROCEDURE — C1713 ANCHOR/SCREW BN/BN,TIS/BN: HCPCS | Performed by: NEUROLOGICAL SURGERY

## 2021-02-02 PROCEDURE — 25010000002 MIDAZOLAM PER 1 MG: Performed by: NURSE ANESTHETIST, CERTIFIED REGISTERED

## 2021-02-02 PROCEDURE — 25010000003 CEFAZOLIN IN DEXTROSE 2-4 GM/100ML-% SOLUTION: Performed by: NEUROLOGICAL SURGERY

## 2021-02-02 PROCEDURE — 22853 INSJ BIOMECHANICAL DEVICE: CPT | Performed by: NEUROLOGICAL SURGERY

## 2021-02-02 PROCEDURE — 0: Performed by: NEUROLOGICAL SURGERY

## 2021-02-02 PROCEDURE — 25010000002 PHENYLEPHRINE PER 1 ML: Performed by: NURSE ANESTHETIST, CERTIFIED REGISTERED

## 2021-02-02 PROCEDURE — 22551 ARTHRD ANT NTRBDY CERVICAL: CPT | Performed by: NEUROLOGICAL SURGERY

## 2021-02-02 PROCEDURE — 25010000002 HYDROMORPHONE PER 4 MG: Performed by: NURSE ANESTHETIST, CERTIFIED REGISTERED

## 2021-02-02 PROCEDURE — 76000 FLUOROSCOPY <1 HR PHYS/QHP: CPT

## 2021-02-02 PROCEDURE — 25010000002 ONDANSETRON PER 1 MG: Performed by: NURSE ANESTHETIST, CERTIFIED REGISTERED

## 2021-02-02 PROCEDURE — 25010000002 FENTANYL CITRATE (PF) 100 MCG/2ML SOLUTION: Performed by: ANESTHESIOLOGY

## 2021-02-02 PROCEDURE — 25010000002 PROPOFOL 10 MG/ML EMULSION: Performed by: NURSE ANESTHETIST, CERTIFIED REGISTERED

## 2021-02-02 PROCEDURE — 25010000002 HYDRALAZINE PER 20 MG: Performed by: NURSE ANESTHETIST, CERTIFIED REGISTERED

## 2021-02-02 PROCEDURE — 25010000002 NEOSTIGMINE PER 0.5 MG: Performed by: NURSE ANESTHETIST, CERTIFIED REGISTERED

## 2021-02-02 PROCEDURE — C1889 IMPLANT/INSERT DEVICE, NOC: HCPCS | Performed by: NEUROLOGICAL SURGERY

## 2021-02-02 PROCEDURE — 25010000002 DEXAMETHASONE PER 1 MG: Performed by: NURSE ANESTHETIST, CERTIFIED REGISTERED

## 2021-02-02 PROCEDURE — 25010000003 CEFAZOLIN PER 500 MG: Performed by: NEUROLOGICAL SURGERY

## 2021-02-02 PROCEDURE — 25010000002 FENTANYL CITRATE (PF) 100 MCG/2ML SOLUTION: Performed by: NURSE ANESTHETIST, CERTIFIED REGISTERED

## 2021-02-02 PROCEDURE — 22845 INSERT SPINE FIXATION DEVICE: CPT | Performed by: NEUROLOGICAL SURGERY

## 2021-02-02 PROCEDURE — 25010000003 HYDROMORPHONE HCL PF 50 MG/5ML SOLUTION: Performed by: NEUROLOGICAL SURGERY

## 2021-02-02 PROCEDURE — 22845 INSERT SPINE FIXATION DEVICE: CPT | Performed by: SPECIALIST/TECHNOLOGIST, OTHER

## 2021-02-02 PROCEDURE — 72040 X-RAY EXAM NECK SPINE 2-3 VW: CPT

## 2021-02-02 PROCEDURE — 22551 ARTHRD ANT NTRBDY CERVICAL: CPT | Performed by: SPECIALIST/TECHNOLOGIST, OTHER

## 2021-02-02 PROCEDURE — 25010000002 MAGNESIUM SULFATE PER 500 MG OF MAGNESIUM: Performed by: NURSE ANESTHETIST, CERTIFIED REGISTERED

## 2021-02-02 PROCEDURE — 25010000002 MIDAZOLAM PER 1 MG: Performed by: ANESTHESIOLOGY

## 2021-02-02 DEVICE — INTERBODY FUSION DEVICE NANOLOCK SURFACE TECHNOLOGY 6 DEGREE SMALL 7MM
Type: IMPLANTABLE DEVICE | Site: SPINE CERVICAL | Status: FUNCTIONAL
Brand: ENDOSKELETON TC

## 2021-02-02 DEVICE — SCREW 3120314 4.0 X 14 SELF TAP VAR
Type: IMPLANTABLE DEVICE | Site: SPINE CERVICAL | Status: FUNCTIONAL
Brand: ATLANTIS® ANTERIOR CERVICAL PLATE SYSTEM

## 2021-02-02 DEVICE — SSC BONE WAX
Type: IMPLANTABLE DEVICE | Site: SPINE CERVICAL | Status: FUNCTIONAL
Brand: SSC BONE WAX

## 2021-02-02 DEVICE — PUTTY DBF GRAFTON 3CC: Type: IMPLANTABLE DEVICE | Site: SPINE CERVICAL | Status: FUNCTIONAL

## 2021-02-02 DEVICE — FLOSEAL HEMOSTATIC MATRIX, 10ML
Type: IMPLANTABLE DEVICE | Site: SPINE CERVICAL | Status: FUNCTIONAL
Brand: FLOSEAL HEMOSTATIC MATRIX

## 2021-02-02 RX ORDER — DEXAMETHASONE SODIUM PHOSPHATE 10 MG/ML
INJECTION INTRAMUSCULAR; INTRAVENOUS AS NEEDED
Status: DISCONTINUED | OUTPATIENT
Start: 2021-02-02 | End: 2021-02-02 | Stop reason: SURG

## 2021-02-02 RX ORDER — ROCURONIUM BROMIDE 10 MG/ML
INJECTION, SOLUTION INTRAVENOUS AS NEEDED
Status: DISCONTINUED | OUTPATIENT
Start: 2021-02-02 | End: 2021-02-02 | Stop reason: SURG

## 2021-02-02 RX ORDER — PROPOFOL 10 MG/ML
VIAL (ML) INTRAVENOUS AS NEEDED
Status: DISCONTINUED | OUTPATIENT
Start: 2021-02-02 | End: 2021-02-02 | Stop reason: SURG

## 2021-02-02 RX ORDER — METHOCARBAMOL 100 MG/ML
500 INJECTION, SOLUTION INTRAMUSCULAR; INTRAVENOUS ONCE
Status: COMPLETED | OUTPATIENT
Start: 2021-02-02 | End: 2021-02-02

## 2021-02-02 RX ORDER — MIDAZOLAM HYDROCHLORIDE 1 MG/ML
INJECTION INTRAMUSCULAR; INTRAVENOUS AS NEEDED
Status: DISCONTINUED | OUTPATIENT
Start: 2021-02-02 | End: 2021-02-02 | Stop reason: SURG

## 2021-02-02 RX ORDER — ONDANSETRON 2 MG/ML
4 INJECTION INTRAMUSCULAR; INTRAVENOUS ONCE AS NEEDED
Status: DISCONTINUED | OUTPATIENT
Start: 2021-02-02 | End: 2021-02-02 | Stop reason: HOSPADM

## 2021-02-02 RX ORDER — ACETAMINOPHEN 325 MG/1
650 TABLET ORAL EVERY 4 HOURS PRN
Status: DISCONTINUED | OUTPATIENT
Start: 2021-02-02 | End: 2021-02-05 | Stop reason: HOSPADM

## 2021-02-02 RX ORDER — ZOLPIDEM TARTRATE 5 MG/1
10 TABLET ORAL NIGHTLY PRN
Status: DISCONTINUED | OUTPATIENT
Start: 2021-02-02 | End: 2021-02-05 | Stop reason: HOSPADM

## 2021-02-02 RX ORDER — TIZANIDINE 4 MG/1
4 TABLET ORAL EVERY 8 HOURS PRN
Status: DISCONTINUED | OUTPATIENT
Start: 2021-02-02 | End: 2021-02-05 | Stop reason: HOSPADM

## 2021-02-02 RX ORDER — MIDAZOLAM HYDROCHLORIDE 1 MG/ML
1 INJECTION INTRAMUSCULAR; INTRAVENOUS
Status: DISCONTINUED | OUTPATIENT
Start: 2021-02-02 | End: 2021-02-02 | Stop reason: HOSPADM

## 2021-02-02 RX ORDER — OXYCODONE HYDROCHLORIDE 15 MG/1
30 TABLET ORAL EVERY 6 HOURS PRN
Status: DISCONTINUED | OUTPATIENT
Start: 2021-02-02 | End: 2021-02-05 | Stop reason: HOSPADM

## 2021-02-02 RX ORDER — SODIUM CHLORIDE 0.9 % (FLUSH) 0.9 %
3 SYRINGE (ML) INJECTION EVERY 12 HOURS SCHEDULED
Status: DISCONTINUED | OUTPATIENT
Start: 2021-02-02 | End: 2021-02-02 | Stop reason: HOSPADM

## 2021-02-02 RX ORDER — HYDROCODONE BITARTRATE AND ACETAMINOPHEN 7.5; 325 MG/1; MG/1
1 TABLET ORAL ONCE AS NEEDED
Status: DISCONTINUED | OUTPATIENT
Start: 2021-02-02 | End: 2021-02-02 | Stop reason: HOSPADM

## 2021-02-02 RX ORDER — SODIUM CHLORIDE 0.9 % (FLUSH) 0.9 %
10 SYRINGE (ML) INJECTION AS NEEDED
Status: DISCONTINUED | OUTPATIENT
Start: 2021-02-02 | End: 2021-02-05 | Stop reason: HOSPADM

## 2021-02-02 RX ORDER — FAMOTIDINE 10 MG/ML
20 INJECTION, SOLUTION INTRAVENOUS ONCE
Status: COMPLETED | OUTPATIENT
Start: 2021-02-02 | End: 2021-02-02

## 2021-02-02 RX ORDER — LABETALOL HYDROCHLORIDE 5 MG/ML
5 INJECTION, SOLUTION INTRAVENOUS
Status: DISCONTINUED | OUTPATIENT
Start: 2021-02-02 | End: 2021-02-02 | Stop reason: HOSPADM

## 2021-02-02 RX ORDER — SODIUM CHLORIDE, SODIUM LACTATE, POTASSIUM CHLORIDE, CALCIUM CHLORIDE 600; 310; 30; 20 MG/100ML; MG/100ML; MG/100ML; MG/100ML
9 INJECTION, SOLUTION INTRAVENOUS CONTINUOUS
Status: DISCONTINUED | OUTPATIENT
Start: 2021-02-02 | End: 2021-02-02

## 2021-02-02 RX ORDER — ONDANSETRON 4 MG/1
4 TABLET, FILM COATED ORAL EVERY 6 HOURS PRN
Status: DISCONTINUED | OUTPATIENT
Start: 2021-02-02 | End: 2021-02-05 | Stop reason: HOSPADM

## 2021-02-02 RX ORDER — DOCUSATE SODIUM 100 MG/1
100 CAPSULE, LIQUID FILLED ORAL 2 TIMES DAILY PRN
Status: DISCONTINUED | OUTPATIENT
Start: 2021-02-02 | End: 2021-02-03

## 2021-02-02 RX ORDER — SODIUM CHLORIDE, SODIUM LACTATE, POTASSIUM CHLORIDE, CALCIUM CHLORIDE 600; 310; 30; 20 MG/100ML; MG/100ML; MG/100ML; MG/100ML
80 INJECTION, SOLUTION INTRAVENOUS CONTINUOUS
Status: DISCONTINUED | OUTPATIENT
Start: 2021-02-02 | End: 2021-02-03

## 2021-02-02 RX ORDER — MORPHINE SULFATE 15 MG/1
15 TABLET, FILM COATED, EXTENDED RELEASE ORAL EVERY 12 HOURS SCHEDULED
Status: DISCONTINUED | OUTPATIENT
Start: 2021-02-02 | End: 2021-02-05 | Stop reason: HOSPADM

## 2021-02-02 RX ORDER — SODIUM CHLORIDE 0.9 % (FLUSH) 0.9 %
3 SYRINGE (ML) INJECTION EVERY 12 HOURS SCHEDULED
Status: DISCONTINUED | OUTPATIENT
Start: 2021-02-02 | End: 2021-02-03

## 2021-02-02 RX ORDER — MAGNESIUM SULFATE HEPTAHYDRATE 500 MG/ML
INJECTION, SOLUTION INTRAMUSCULAR; INTRAVENOUS AS NEEDED
Status: DISCONTINUED | OUTPATIENT
Start: 2021-02-02 | End: 2021-02-02 | Stop reason: SURG

## 2021-02-02 RX ORDER — GLYCOPYRROLATE 0.2 MG/ML
INJECTION INTRAMUSCULAR; INTRAVENOUS AS NEEDED
Status: DISCONTINUED | OUTPATIENT
Start: 2021-02-02 | End: 2021-02-02 | Stop reason: SURG

## 2021-02-02 RX ORDER — METHOCARBAMOL 500 MG/1
500 TABLET, FILM COATED ORAL EVERY 12 HOURS SCHEDULED
Status: DISCONTINUED | OUTPATIENT
Start: 2021-02-02 | End: 2021-02-03

## 2021-02-02 RX ORDER — DIPHENHYDRAMINE HYDROCHLORIDE 50 MG/ML
12.5 INJECTION INTRAMUSCULAR; INTRAVENOUS
Status: DISCONTINUED | OUTPATIENT
Start: 2021-02-02 | End: 2021-02-02 | Stop reason: HOSPADM

## 2021-02-02 RX ORDER — FENTANYL CITRATE 50 UG/ML
50 INJECTION, SOLUTION INTRAMUSCULAR; INTRAVENOUS
Status: DISCONTINUED | OUTPATIENT
Start: 2021-02-02 | End: 2021-02-02 | Stop reason: HOSPADM

## 2021-02-02 RX ORDER — KETAMINE HYDROCHLORIDE 10 MG/ML
INJECTION INTRAMUSCULAR; INTRAVENOUS AS NEEDED
Status: DISCONTINUED | OUTPATIENT
Start: 2021-02-02 | End: 2021-02-02 | Stop reason: SURG

## 2021-02-02 RX ORDER — MEDROXYPROGESTERONE ACETATE 2.5 MG/1
2.5 TABLET ORAL DAILY
Status: DISCONTINUED | OUTPATIENT
Start: 2021-02-03 | End: 2021-02-05 | Stop reason: HOSPADM

## 2021-02-02 RX ORDER — HYDROMORPHONE HCL 110MG/55ML
PATIENT CONTROLLED ANALGESIA SYRINGE INTRAVENOUS AS NEEDED
Status: DISCONTINUED | OUTPATIENT
Start: 2021-02-02 | End: 2021-02-02 | Stop reason: SURG

## 2021-02-02 RX ORDER — LIDOCAINE HYDROCHLORIDE 10 MG/ML
0.5 INJECTION, SOLUTION EPIDURAL; INFILTRATION; INTRACAUDAL; PERINEURAL ONCE AS NEEDED
Status: DISCONTINUED | OUTPATIENT
Start: 2021-02-02 | End: 2021-02-02 | Stop reason: HOSPADM

## 2021-02-02 RX ORDER — PROMETHAZINE HYDROCHLORIDE 25 MG/1
25 TABLET ORAL ONCE AS NEEDED
Status: DISCONTINUED | OUTPATIENT
Start: 2021-02-02 | End: 2021-02-02 | Stop reason: HOSPADM

## 2021-02-02 RX ORDER — ONDANSETRON 2 MG/ML
INJECTION INTRAMUSCULAR; INTRAVENOUS AS NEEDED
Status: DISCONTINUED | OUTPATIENT
Start: 2021-02-02 | End: 2021-02-02 | Stop reason: SURG

## 2021-02-02 RX ORDER — HYDROMORPHONE HCL IN 0.9% NACL 10 MG/50ML
PATIENT CONTROLLED ANALGESIA SYRINGE INTRAVENOUS CONTINUOUS
Status: DISCONTINUED | OUTPATIENT
Start: 2021-02-02 | End: 2021-02-03

## 2021-02-02 RX ORDER — ONDANSETRON 2 MG/ML
4 INJECTION INTRAMUSCULAR; INTRAVENOUS EVERY 6 HOURS PRN
Status: DISCONTINUED | OUTPATIENT
Start: 2021-02-02 | End: 2021-02-05 | Stop reason: HOSPADM

## 2021-02-02 RX ORDER — HYDRALAZINE HYDROCHLORIDE 20 MG/ML
5 INJECTION INTRAMUSCULAR; INTRAVENOUS
Status: DISCONTINUED | OUTPATIENT
Start: 2021-02-02 | End: 2021-02-02 | Stop reason: HOSPADM

## 2021-02-02 RX ORDER — FLUMAZENIL 0.1 MG/ML
0.2 INJECTION INTRAVENOUS AS NEEDED
Status: DISCONTINUED | OUTPATIENT
Start: 2021-02-02 | End: 2021-02-02 | Stop reason: HOSPADM

## 2021-02-02 RX ORDER — KETOROLAC TROMETHAMINE 30 MG/ML
30 INJECTION, SOLUTION INTRAMUSCULAR; INTRAVENOUS EVERY 6 HOURS PRN
Status: DISCONTINUED | OUTPATIENT
Start: 2021-02-02 | End: 2021-02-02 | Stop reason: HOSPADM

## 2021-02-02 RX ORDER — DIPHENHYDRAMINE HCL 25 MG
25 CAPSULE ORAL
Status: DISCONTINUED | OUTPATIENT
Start: 2021-02-02 | End: 2021-02-02 | Stop reason: HOSPADM

## 2021-02-02 RX ORDER — ESTRADIOL 0.05 MG/D
1 FILM, EXTENDED RELEASE TRANSDERMAL 2 TIMES WEEKLY
Status: DISCONTINUED | OUTPATIENT
Start: 2021-02-04 | End: 2021-02-05 | Stop reason: HOSPADM

## 2021-02-02 RX ORDER — HYDROMORPHONE HYDROCHLORIDE 1 MG/ML
0.5 INJECTION, SOLUTION INTRAMUSCULAR; INTRAVENOUS; SUBCUTANEOUS
Status: DISCONTINUED | OUTPATIENT
Start: 2021-02-02 | End: 2021-02-02 | Stop reason: HOSPADM

## 2021-02-02 RX ORDER — MIDAZOLAM HYDROCHLORIDE 1 MG/ML
2 INJECTION INTRAMUSCULAR; INTRAVENOUS
Status: DISCONTINUED | OUTPATIENT
Start: 2021-02-02 | End: 2021-02-02 | Stop reason: HOSPADM

## 2021-02-02 RX ORDER — PROMETHAZINE HYDROCHLORIDE 25 MG/1
25 SUPPOSITORY RECTAL ONCE AS NEEDED
Status: DISCONTINUED | OUTPATIENT
Start: 2021-02-02 | End: 2021-02-02 | Stop reason: HOSPADM

## 2021-02-02 RX ORDER — EPHEDRINE SULFATE 50 MG/ML
5 INJECTION, SOLUTION INTRAVENOUS ONCE AS NEEDED
Status: DISCONTINUED | OUTPATIENT
Start: 2021-02-02 | End: 2021-02-02 | Stop reason: HOSPADM

## 2021-02-02 RX ORDER — AMOXICILLIN 250 MG
1 CAPSULE ORAL NIGHTLY PRN
Status: DISCONTINUED | OUTPATIENT
Start: 2021-02-02 | End: 2021-02-05 | Stop reason: HOSPADM

## 2021-02-02 RX ORDER — LIDOCAINE HYDROCHLORIDE 20 MG/ML
INJECTION, SOLUTION INFILTRATION; PERINEURAL AS NEEDED
Status: DISCONTINUED | OUTPATIENT
Start: 2021-02-02 | End: 2021-02-02 | Stop reason: SURG

## 2021-02-02 RX ORDER — FENTANYL CITRATE 50 UG/ML
INJECTION, SOLUTION INTRAMUSCULAR; INTRAVENOUS AS NEEDED
Status: DISCONTINUED | OUTPATIENT
Start: 2021-02-02 | End: 2021-02-02 | Stop reason: SURG

## 2021-02-02 RX ORDER — OXYCODONE AND ACETAMINOPHEN 7.5; 325 MG/1; MG/1
1 TABLET ORAL ONCE AS NEEDED
Status: DISCONTINUED | OUTPATIENT
Start: 2021-02-02 | End: 2021-02-02 | Stop reason: HOSPADM

## 2021-02-02 RX ORDER — CEFAZOLIN SODIUM 2 G/100ML
2 INJECTION, SOLUTION INTRAVENOUS ONCE
Status: COMPLETED | OUTPATIENT
Start: 2021-02-02 | End: 2021-02-02

## 2021-02-02 RX ORDER — NALOXONE HCL 0.4 MG/ML
0.1 VIAL (ML) INJECTION
Status: DISCONTINUED | OUTPATIENT
Start: 2021-02-02 | End: 2021-02-05 | Stop reason: HOSPADM

## 2021-02-02 RX ORDER — ALPRAZOLAM 0.5 MG/1
0.5 TABLET ORAL 2 TIMES DAILY PRN
Status: DISCONTINUED | OUTPATIENT
Start: 2021-02-02 | End: 2021-02-05 | Stop reason: HOSPADM

## 2021-02-02 RX ORDER — OXYCODONE HYDROCHLORIDE 15 MG/1
30 TABLET ORAL EVERY 6 HOURS PRN
Status: DISCONTINUED | OUTPATIENT
Start: 2021-02-02 | End: 2021-02-02 | Stop reason: SDUPTHER

## 2021-02-02 RX ORDER — SODIUM CHLORIDE 0.9 % (FLUSH) 0.9 %
3-10 SYRINGE (ML) INJECTION AS NEEDED
Status: DISCONTINUED | OUTPATIENT
Start: 2021-02-02 | End: 2021-02-02 | Stop reason: HOSPADM

## 2021-02-02 RX ORDER — NALOXONE HCL 0.4 MG/ML
0.2 VIAL (ML) INJECTION AS NEEDED
Status: DISCONTINUED | OUTPATIENT
Start: 2021-02-02 | End: 2021-02-02 | Stop reason: HOSPADM

## 2021-02-02 RX ADMIN — CEFAZOLIN SODIUM 2 G: 2 INJECTION, SOLUTION INTRAVENOUS at 08:06

## 2021-02-02 RX ADMIN — MORPHINE SULFATE 15 MG: 15 TABLET, FILM COATED, EXTENDED RELEASE ORAL at 17:35

## 2021-02-02 RX ADMIN — NEOSTIGMINE METHYLSULFATE 4 MG: 1 INJECTION INTRAMUSCULAR; INTRAVENOUS; SUBCUTANEOUS at 09:43

## 2021-02-02 RX ADMIN — PHENYLEPHRINE HYDROCHLORIDE 100 MCG: 10 INJECTION INTRAVENOUS at 08:23

## 2021-02-02 RX ADMIN — KETAMINE HYDROCHLORIDE 30 MG: 10 INJECTION INTRAMUSCULAR; INTRAVENOUS at 08:14

## 2021-02-02 RX ADMIN — METHOCARBAMOL 500 MG: 100 INJECTION INTRAMUSCULAR; INTRAVENOUS at 10:21

## 2021-02-02 RX ADMIN — FENTANYL CITRATE 50 MCG: 50 INJECTION, SOLUTION INTRAMUSCULAR; INTRAVENOUS at 11:29

## 2021-02-02 RX ADMIN — SODIUM CHLORIDE, POTASSIUM CHLORIDE, SODIUM LACTATE AND CALCIUM CHLORIDE 9 ML/HR: 600; 310; 30; 20 INJECTION, SOLUTION INTRAVENOUS at 07:41

## 2021-02-02 RX ADMIN — FAMOTIDINE 20 MG: 10 INJECTION INTRAVENOUS at 07:42

## 2021-02-02 RX ADMIN — FENTANYL CITRATE 50 MCG: 50 INJECTION, SOLUTION INTRAMUSCULAR; INTRAVENOUS at 07:42

## 2021-02-02 RX ADMIN — HYDROMORPHONE HYDROCHLORIDE 0.5 MG: 1 INJECTION, SOLUTION INTRAMUSCULAR; INTRAVENOUS; SUBCUTANEOUS at 10:17

## 2021-02-02 RX ADMIN — HYDROMORPHONE HYDROCHLORIDE 0.5 MG: 1 INJECTION, SOLUTION INTRAMUSCULAR; INTRAVENOUS; SUBCUTANEOUS at 10:30

## 2021-02-02 RX ADMIN — DEXAMETHASONE SODIUM PHOSPHATE 10 MG: 10 INJECTION INTRAMUSCULAR; INTRAVENOUS at 08:14

## 2021-02-02 RX ADMIN — HYDROMORPHONE HYDROCHLORIDE: 10 INJECTION, SOLUTION INTRAMUSCULAR; INTRAVENOUS; SUBCUTANEOUS at 10:41

## 2021-02-02 RX ADMIN — HYDROMORPHONE HYDROCHLORIDE 0.5 MG: 1 INJECTION, SOLUTION INTRAMUSCULAR; INTRAVENOUS; SUBCUTANEOUS at 11:53

## 2021-02-02 RX ADMIN — PHENYLEPHRINE HYDROCHLORIDE 100 MCG: 10 INJECTION INTRAVENOUS at 08:35

## 2021-02-02 RX ADMIN — FENTANYL CITRATE 50 MCG: 50 INJECTION, SOLUTION INTRAMUSCULAR; INTRAVENOUS at 07:54

## 2021-02-02 RX ADMIN — MIDAZOLAM 1 MG: 1 INJECTION INTRAMUSCULAR; INTRAVENOUS at 07:54

## 2021-02-02 RX ADMIN — SODIUM CHLORIDE, POTASSIUM CHLORIDE, SODIUM LACTATE AND CALCIUM CHLORIDE 9 ML/HR: 600; 310; 30; 20 INJECTION, SOLUTION INTRAVENOUS at 10:46

## 2021-02-02 RX ADMIN — SODIUM CHLORIDE, POTASSIUM CHLORIDE, SODIUM LACTATE AND CALCIUM CHLORIDE 80 ML/HR: 600; 310; 30; 20 INJECTION, SOLUTION INTRAVENOUS at 17:29

## 2021-02-02 RX ADMIN — FENTANYL CITRATE 50 MCG: 50 INJECTION, SOLUTION INTRAMUSCULAR; INTRAVENOUS at 10:54

## 2021-02-02 RX ADMIN — HYDROMORPHONE HYDROCHLORIDE 0.5 MG: 2 INJECTION, SOLUTION INTRAMUSCULAR; INTRAVENOUS; SUBCUTANEOUS at 08:44

## 2021-02-02 RX ADMIN — HYDROMORPHONE HYDROCHLORIDE 0.5 MG: 2 INJECTION, SOLUTION INTRAMUSCULAR; INTRAVENOUS; SUBCUTANEOUS at 10:04

## 2021-02-02 RX ADMIN — GLYCOPYRROLATE 0.2 MG: 0.2 INJECTION INTRAMUSCULAR; INTRAVENOUS at 08:35

## 2021-02-02 RX ADMIN — KETAMINE HYDROCHLORIDE 10 MG: 10 INJECTION INTRAMUSCULAR; INTRAVENOUS at 09:21

## 2021-02-02 RX ADMIN — METHOCARBAMOL TABLETS 500 MG: 500 TABLET, COATED ORAL at 20:33

## 2021-02-02 RX ADMIN — HYDROMORPHONE HYDROCHLORIDE 0.5 MG: 1 INJECTION, SOLUTION INTRAMUSCULAR; INTRAVENOUS; SUBCUTANEOUS at 11:02

## 2021-02-02 RX ADMIN — ROCURONIUM BROMIDE 50 MG: 10 INJECTION INTRAVENOUS at 08:08

## 2021-02-02 RX ADMIN — FENTANYL CITRATE 50 MCG: 50 INJECTION, SOLUTION INTRAMUSCULAR; INTRAVENOUS at 10:12

## 2021-02-02 RX ADMIN — ONDANSETRON HYDROCHLORIDE 4 MG: 2 SOLUTION INTRAMUSCULAR; INTRAVENOUS at 09:41

## 2021-02-02 RX ADMIN — GLYCOPYRROLATE 0.4 MG: 0.2 INJECTION INTRAMUSCULAR; INTRAVENOUS at 09:43

## 2021-02-02 RX ADMIN — MAGNESIUM SULFATE HEPTAHYDRATE 2 G: 500 INJECTION, SOLUTION INTRAMUSCULAR; INTRAVENOUS at 08:32

## 2021-02-02 RX ADMIN — ZOLPIDEM TARTRATE 10 MG: 5 TABLET ORAL at 22:41

## 2021-02-02 RX ADMIN — FENTANYL CITRATE 50 MCG: 50 INJECTION, SOLUTION INTRAMUSCULAR; INTRAVENOUS at 10:26

## 2021-02-02 RX ADMIN — HYDROMORPHONE HYDROCHLORIDE 0.5 MG: 1 INJECTION, SOLUTION INTRAMUSCULAR; INTRAVENOUS; SUBCUTANEOUS at 11:29

## 2021-02-02 RX ADMIN — HYDRALAZINE HYDROCHLORIDE 5 MG: 20 INJECTION INTRAMUSCULAR; INTRAVENOUS at 10:27

## 2021-02-02 RX ADMIN — KETAMINE HYDROCHLORIDE 10 MG: 10 INJECTION INTRAMUSCULAR; INTRAVENOUS at 10:07

## 2021-02-02 RX ADMIN — MIDAZOLAM 2 MG: 1 INJECTION INTRAMUSCULAR; INTRAVENOUS at 08:06

## 2021-02-02 RX ADMIN — FENTANYL CITRATE 100 MCG: 50 INJECTION INTRAMUSCULAR; INTRAVENOUS at 08:07

## 2021-02-02 RX ADMIN — LIDOCAINE HYDROCHLORIDE 100 MG: 20 INJECTION, SOLUTION INFILTRATION; PERINEURAL at 08:08

## 2021-02-02 RX ADMIN — PROPOFOL 200 MG: 10 INJECTION, EMULSION INTRAVENOUS at 08:08

## 2021-02-02 RX ADMIN — KETOROLAC TROMETHAMINE 30 MG: 30 INJECTION, SOLUTION INTRAMUSCULAR at 10:56

## 2021-02-02 NOTE — ANESTHESIA PROCEDURE NOTES
Airway  Urgency: elective    Date/Time: 2/2/2021 8:10 AM  Airway not difficult    General Information and Staff    Patient location during procedure: OR  Anesthesiologist: Aren Kasper MD  CRNA: Talya Fine CRNA    Indications and Patient Condition  Indications for airway management: airway protection    Preoxygenated: yes  MILS not maintained throughout  Mask difficulty assessment: 1 - vent by mask    Final Airway Details  Final airway type: endotracheal airway      Successful airway: ETT  Cuffed: yes   Successful intubation technique: direct laryngoscopy  Endotracheal tube insertion site: oral  Blade: Pratima  Blade size: 3  ETT size (mm): 7.0  Cormack-Lehane Classification: grade I - full view of glottis  Placement verified by: chest auscultation and capnometry   Cuff volume (mL): 6  Measured from: lips  ETT/EBT  to lips (cm): 21  Number of attempts at approach: 1  Assessment: lips, teeth, and gum same as pre-op and atraumatic intubation    Additional Comments  Pt preoxygenated prior to induction, easy mask airway, atraumatic intubation,+ ETCO2, + bs bilat,  ETT secured and connected to ventilator.

## 2021-02-02 NOTE — ANESTHESIA PREPROCEDURE EVALUATION
Anesthesia Evaluation     Patient summary reviewed and Nursing notes reviewed                Airway   Mallampati: II  TM distance: >3 FB  Neck ROM: full  Dental      Pulmonary    (+) a smoker Former, shortness of breath,   Cardiovascular     ECG reviewed  Rhythm: regular  Rate: normal    (+) hypertension, hyperlipidemia,       Neuro/Psych  (+) headaches, numbness, psychiatric history,     GI/Hepatic/Renal/Endo    (+)   diabetes mellitus,     Musculoskeletal     (+) neck pain,   Abdominal    Substance History - negative use     OB/GYN negative ob/gyn ROS         Other   arthritis,                      Anesthesia Plan    ASA 2     general     intravenous induction     Anesthetic plan, all risks, benefits, and alternatives have been provided, discussed and informed consent has been obtained with: patient.

## 2021-02-02 NOTE — PLAN OF CARE
Goal Outcome Evaluation:  Plan of Care Reviewed With: patient  Progress: improving  Outcome Summary: good pain control with PCA, ambulating stand by assist, voiding per BRP, BP elevated since surgery, NVI, dressing c/d/i, plan to dc home tomorrow, educated on bp meds and monitoring

## 2021-02-02 NOTE — BRIEF OP NOTE
CERVICAL DISCECTOMY ANTERIOR FUSION WITH INSTRUMENTATION  Progress Note    Tatiana Reynolds  2/2/2021    Pre-op Diagnosis:   Osteophytic cervical disc herniation C5-C6 with radiculopathy       Postop diagnosis:  Same as above    Procedure/CPT® Codes:        Procedure(s):  CERVICAL 5-6 DISCECTOMY ANTERIOR WITH FUSION, WITH CAGES AND PLATE    Surgeon(s):  Neal Andrews MD    Anesthesia: General    Staff:   Circulator: Leyla Lerma RN; Mercedez Brown RN  Radiology Technologist: Joan Mims  Scrub Person: Twin Luna; Keila Blunt  Vendor Representative: Berto Stanton  Assistant: La Reardon CSA  Assistant: La Reardon CSA      Estimated Blood Loss: 25 mL    Urine Voided: 75 mL    Specimens:                None        Drains:   Urethral Catheter 16 Fr. (Active)       Findings: Osteophytic root impingement right worse than left    Complications: None    Assistant: La Reardon CSA  was responsible for performing the following activities: Retraction, Suction, Irrigation, Suturing, Closing and Placing Dressing and their skilled assistance was necessary for the success of this case.    Neal Andrews MD     Date: 2/2/2021  Time: 10:06 EST

## 2021-02-02 NOTE — H&P
Primary Visit Coverage    Payer Plan Sponsor Code Group Number Group Name   [employer] [facility] [employer]  [id number]    Primary Visit Coverage Subscriber    Subscriber ID Subscriber Name Subscriber SSN Subscriber Address   [id number] TATIANA REYNOLDS  [id number] [place] [address]   Encounter Information     Provider Department Encounter #   2/2/2021 3:00 PM Neal Andrews MD [facility] [id number]   Office Visit    11/16/2020  [facility]     Nael Andrews MD  Neurosurgery  Postlaminectomy syndrome, lumbar region +2 more  Dx  Back Pain  Reason for Visit   Progress Notes    Expand All Collapse All  []Expand All by Default     Subjective      History of Present Illness: Tatiana Reynolds is a 55 y.o. female is here today for Lumbar MRI and Cervical  MRI follow-up.      Patient was last seen 2.8.2019 for increase low back pain and left thigh and buttock pain. Left leg weakness and N/T.     Patient had MRI lumbar spine, MRI cervical spine, XR cervical spine, lumbar spine 10.12.2020.     Patient reports today neck pain and low back pain. She reports that there is no pain in her legs She reports that she gets shooting pain down her R arm. She also states that when she lays down at night her feet will pull. Patient states that the ablation does help her with pain when she has them done.     Patient is currently taking Oxycodone 30 mg QID for pain.     It has been about a year and a half since I have seen her. It finally took all this time to get the pictures. She has had multiple injections in the neck and the back. She has pain in the neck and the right arm and no weakness and in the low back it is pain in the low back and both legs. She has had a thoracic discectomy at T8-T9 and 2 lumbar surgeries at L4-L5 on the right. She has had a surgery on the left at L5-S1. She continues to have back and bilateral leg pain. I reviewed  the MRIs which did not show anything of great significance other than epidural scar tissue and postoperative changes at L4-L5 and L5-S1. She has already talked to Dr. Riley about a spinal cord stimulator and has in fact taken a neuropsychological evaluation. I think that is a good idea for the low back. I would not recommend a fusion or any other reoperation. The neck is a different story, however. She does have some osteophytic disease particularly on the right at C5-C6, and given that she has exhausted conservative treatment there, an ACDF at C5-C6 and C6-C7 with cages and plates is not unreasonable. She thinks she wants to pursue that but probably not until next year. She gets pain medications from Dr. Riley and I told her we would provide the first 1 or 2 prescriptions after surgery and then she could go back and get whatever medicine she needs from Dr. Riley. 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 She will let us know when she would like to proceed with the surgical surgery and will speak to Dr. Riley about the stimulator.        Back pain     Back Pain  This is a recurrent problem. The problem occurs constantly. The pain is present in the lumbar spine. The quality of the pain is described as burning. The pain is at a severity of 6/10. The pain is moderate. The pain is worse during the day. The symptoms are aggravated by sitting. Associated symptoms include numbness and tingling. Pertinent negatives include no bladder incontinence, bowel incontinence, fever or leg pain. (R arm) The treatment provided mild relief.         The following portions of the patient's history were reviewed and updated as appropriate: allergies, current medications, past family history, past medical history, past social history, past surgical history and problem list.     Review of Systems   Constitutional: Negative for chills and fever.   HENT:  "Negative for congestion.    Gastrointestinal: Negative for bowel incontinence.   Genitourinary: Negative for bladder incontinence and difficulty urinating.   Musculoskeletal: Positive for back pain. Negative for gait problem and joint swelling.   Neurological: Positive for tingling and numbness.   All other systems reviewed and are negative.                 Objective         Vitals       Vitals:     11/16/20 1505   BP: (!) 173/104   Cuff Size: Adult   Pulse: 90   Temp: 98.1 °F (36.7 °C)   Weight: 59 kg (130 lb)   Height: 68 cm (26.77\")        Body mass index is 127.56 kg/m².        Physical Exam  Constitutional:       Appearance: She is well-developed.   HENT:      Head: Normocephalic and atraumatic.   Eyes:      Extraocular Movements: EOM normal.      Conjunctiva/sclera: Conjunctivae normal.      Pupils: Pupils are equal, round, and reactive to light.      Funduscopic exam:     Right eye: No papilledema.         Left eye: No papilledema.   Neck:      Vascular: No carotid bruit.   Neurological:      Mental Status: She is oriented to person, place, and time.      Coordination: Finger-Nose-Finger Test and Heel to Shin Test normal.      Gait: Gait is intact.      Deep Tendon Reflexes:      Reflex Scores:       Tricep reflexes are 2+ on the right side and 2+ on the left side.       Bicep reflexes are 2+ on the right side and 2+ on the left side.       Brachioradialis reflexes are 2+ on the right side and 2+ on the left side.       Patellar reflexes are 2+ on the right side and 2+ on the left side.       Achilles reflexes are 2+ on the right side and 2+ on the left side.  Psychiatric:         Speech: Speech normal.         Neurologic Exam      Mental Status   Oriented to person, place, and time.   Registration of memory: Good recent and remote memory.   Attention: normal. Concentration: normal.   Speech: speech is normal   Level of consciousness: alert  Knowledge: consistent with education.      Cranial Nerves      CN II "   Visual fields full to confrontation.   Visual acuity: normal     CN III, IV, VI   Pupils are equal, round, and reactive to light.  Extraocular motions are normal.      CN V   Facial sensation intact.   Right corneal reflex: normal  Left corneal reflex: normal     CN VII   Facial expression full, symmetric.   Right facial weakness: none  Left facial weakness: none     CN VIII   Hearing: intact     CN IX, X   Palate: symmetric     CN XI   Right sternocleidomastoid strength: normal  Left sternocleidomastoid strength: normal     CN XII   Tongue: not atrophic  Tongue deviation: none     Motor Exam   Muscle bulk: normal  Right arm tone: normal  Left arm tone: normal  Right leg tone: normal  Left leg tone: normal     Strength   Strength 5/5 except as noted.      Sensory Exam   Light touch normal.      Gait, Coordination, and Reflexes      Gait  Gait: normal     Coordination   Finger to nose coordination: normal  Heel to shin coordination: normal     Reflexes   Right brachioradialis: 2+  Left brachioradialis: 2+  Right biceps: 2+  Left biceps: 2+  Right triceps: 2+  Left triceps: 2+  Right patellar: 2+  Left patellar: 2+  Right achilles: 2+  Left achilles: 2+  Right : 2+  Left : 2+                 Assessment/Plan      Independent Review of Radiographic Studies:      I personally reviewed the images from the following studies.     I reviewed the lumbar MRI done on 10/12/2020 which shows postoperative changes at L4-L5 and L5-S1 and some epidural scar tissue but no significant stenosis or recurrent disc herniation or instability.  The cervical MRI that was done also on 10/12/2020 shows osteophytic cervical disc disease most significantly at C5-C6 with bilateral root compression right worse than left and osteophytic disease at C6-C7 compressing the C7 root C4-C5 and C7-T1 left fairly open.  Agree with the report.           Medical Decision Making:       As far as the low back goes she can speak with   "proceeding with a spinal cord stimulator.  As far as the neck is concerned, I described and recommended an anterior cervical discectomy and fusion with a cage and plate at C5-C6 and C6-C7.. The goal of surgery is relief of radiating arm pain and improvement of numbness, tingling, and weakness. This will help reduce but may not eliminate midline neck pain. The risks include, but are not limited to, infection, hemorrhage requiring transfusion or reoperation, CSF leak requiring reoperation, incomplete relief of symptoms, difficulty swallowing, hoarseness of voice, hardware problems requiring revision surgery, potential need for additional surgery in the future, stroke, paralysis, coma, and death. The patient would like to think about it and will let us know if and when she would like to proceed, probably early next year.        Diagnoses and all orders for this visit:     1. Postlaminectomy syndrome, lumbar region (Primary)     2. Cervical disc disorder at C5-C6 level with radiculopathy     3. Cervical disc disorder at C6-C7 level with radiculopathy        Return for She will let us know if she wants to pursue surgery.               Instructions       Return for She will let us know if she wants to pursue surgery.   Patient declined After Visit Summary  Additional Documentation    Vitals:    /104  (Cuff Size: Adult)   Pulse 90   Temp 98.1 °F (36.7 °C)   Ht 68 cm (26.77\")   Wt 59 kg (130 lb)   .56 kg/m²   BSA 0.87 m²   Flowsheets:    Outbreak Screening      Encounter Info:    Billing Info,   History,   Allergies,   Detailed Report,   Prep for Surgery Order Report,   PAF,   Chart Review: Routing History,   Coding Summary,   Encounter Facesheet,   Link Facesheet,   ONC Road Map View   ...(7 more)   Communications       Letter sent to Barry Riley MD  [employer]    Scan on 1/19/2021 1044 by Jeanne Arzola: SURGERY LETTER DR HUBER   Orders Placed     None  Medication Changes       None     Medication " List   Visit Diagnoses       Postlaminectomy syndrome, lumbar region     Cervical disc disorder at C5-C6 level with radiculopathy     Cervical disc disorder at C6-C7 level with radiculopathy     Problem List    Current Medications     Disp Start End   ALPRAZolam (XANAX) 0.5 MG tablet      Sig - Route: Take 0.5 mg by mouth 2 (two) times a day as needed for anxiety. - Oral   Class: Historical Med   carisoprodol (SOMA) 350 MG tablet      Sig - Route: Take 350 mg by mouth 4 (four) times a day as needed for muscle spasms. - Oral   Class: Historical Med   estradiol (MINIVELLE, VIVELLE-DOT) 0.05 MG/24HR patch      Sig - Route: Place 1 patch on the skin as directed by provider 2 (Two) Times a Week. - Transdermal   Class: Historical Med   lidocaine (LIDODERM) 5 %      Sig - Route: Place 1 patch on the skin every day. Remove & Discard patch within 12 hours or as directed by MD - Transdermal   Class: Historical Med   medroxyPROGESTERone (PROVERA) 2.5 MG tablet      Sig - Route: Take 2.5 mg by mouth daily. - Oral   Class: Historical Med   Morphine (MS CONTIN) 15 MG 12 hr tablet      Sig - Route: Take 15 mg by mouth 2 (Two) Times a Day. - Oral   Class: Historical Med   oxyCODONE (ROXICODONE) 30 MG immediate release tablet      Sig - Route: Take 30 mg by mouth every 6 (six) hours as needed for moderate pain (4-6). - Oral   Class: Historical Med   tiZANidine (ZANAFLEX) 4 MG tablet      Sig - Route: Take 4 mg by mouth every 8 (eight) hours as needed for muscle spasms. - Oral   Class: Historical Med   zolpidem (AMBIEN) 10 MG tablet      Sig - Route: Take 10 mg by mouth At Night As Needed for Sleep. - Oral   Class: Historical Med   Hospital Medications     Dose Frequency Start End   ceFAZolin in dextrose (ANCEF) IVPB solution 2 g 2 g Once 2/2/2021    Admin Instructions: Administer within 1 hour of surgical incision. Redose 4 hours from pre-op dose if procedure ongoing or >1.5 L blood loss.  Caution: Look alike/sound alike drug alert  "  Route: Intravenous   famotidine (PEPCID) injection 20 mg (Completed) 20 mg Once 2/2/2021 2/2/2021   Admin Instructions: Dilute to 10 mL total volume and give IV push over 2 minutes.   Route: Intravenous   fentaNYL citrate (PF) (SUBLIMAZE) injection 50 mcg 50 mcg Every 10 Minutes PRN 2/2/2021    Admin Instructions: Maximum total dose of fentanyl is 100 mcg.  If given for pain, use the following pain scale:  Mild Pain = Pain Score of 1-3, CPOT 1-2  Moderate Pain = Pain Score of 4-6, CPOT 3-4  Severe Pain = Pain Score of 7-10, CPOT 5-8   Route: Intravenous   lactated ringers infusion 9 mL/hr Continuous 2/2/2021    Route: Intravenous   lidocaine PF 1% (XYLOCAINE) injection 0.5 mL 0.5 mL Once As Needed 2/2/2021    Route: Injection   midazolam (VERSED) injection 1 mg 1 mg Every 10 Minutes PRN 2/2/2021    Admin Instructions: Use the sedation scale for anxiety and DOCUMENT SEDATION SCORE in MAR comment field: mild anxiety = 0-3, moderate anxiety = 4-6, severe anxiety = 7-10. May repeat dose in 10 minutes x 1 then contact provider for additional orders.     Route: Intravenous   Linked Group 1: \"Or\" Linked Group Details       midazolam (VERSED) injection 2 mg 2 mg Every 10 Minutes PRN 2/2/2021    Admin Instructions: Use the sedation scale for anxiety and DOCUMENT SEDATION SCORE in MAR comment field: mild anxiety = 0-3, moderate anxiety = 4-6, severe anxiety = 7-10. May repeat dose in 10 minutes x 1 then contact provider for additional orders.     Route: Intravenous   Linked Group 1: \"Or\" Linked Group Details       sodium chloride 0.9 % flush 3 mL 3 mL Every 12 Hours Scheduled 2/2/2021    Route: Intravenous   sodium chloride 0.9 % flush 3-10 mL 3-10 mL As Needed 2/2/2021    Route: Intravenous     No change in PE. Proceed  As above.  "

## 2021-02-02 NOTE — OP NOTE
Preoperative diagnosis: Osteophytic cervical disc herniation at C5-C6 with bilateral radiculopathy, right worse than left    Postoperative diagnosis: Same as above    Procedures performed: ACDF at C5-C6 with cage and plate    Surgeon: Darryl    First Assistant: La Reardon  (She greatly assisted in the exposure, visualization of neural structures, control of bleeding, retraction, and closure of the incision. Her skilled assistance was necessary for the success of this case.)  Anesthesia:    EBL: 25 cc    Complications: none    Specimen sent: none    Drains: none    Findings: Osteophytic root impingement, right worse than left as expected    Postoperative condition: Good    Indications for the operation:The patient is a 56-year-old female who has had chronic neck pain and bilateral arm pain right worse than left for years.  She was known to have an osteophytic cervical disc herniation predominantly at C5-6 and to a lesser extent at C6-7.  She failed conservative treatment including blocks, physical therapy, medicines and time itself, and she was having trouble using her right arm because of some weakness.  Because of that, she was brought to the operating room for an ACDF at C5-6.  Originally I thought I might do C6-7 but I decided not to since the vast majority of her compression was at the C5-6 level.              Informed consent: She understood that the goal of surgery is relief of radiating arm pain and improvement of numbness, tingling, and weakness. This will help reduce but may not eliminate midline neck pain. The risks include, but are not limited to, infection, hemorrhage requiring transfusion or reoperation, CSF leak requiring reoperation, incomplete relief of symptoms, difficulty swallowing, hoarseness of voice, hardware problems requiring revision surgery, potential need for additional surgery in the future, stroke, paralysis, coma, and death. The patient agrees to proceed.     Details of the  operation: The patient was taken to the operating room and placed on the operating room table in the supine position.  After induction of endotracheal intubation, she got 2 g of Kefzol as per the SCIP protocol.  Decadron 10 mg IV was given.  A Churchill catheter was placed as per the patient’s request.  She was placed in extension with a shoulder roll and the shoulders were taped inferiorly.  We got a pre-incisional x-ray to archana out the incision at the appropriate level.  The anterior surface of the neck was then prepped and draped in the usual sterile fashion.  We did a surgical time-out.  I made a transverse incision at the level of the C5-6 anterior space with a #10 skin knife.  Hemostasis was obtained with monopolar cautery.  Dissection was taken all the way down to the platysma which was divided in a transverse fashion and undermined with Metzenbaum scissors.  The omohyoid was identified and retracted medially.  The sternocleidomastoid was identified and retracted laterally.  The carotid sheath was identified and retracted laterally.  The prevertebral and pretracheal fascia were opened up sharply.  The anterior surface of the spine was palpated.  I put a needle in the disc space and it turned out by C-arm to be C5-6.  The longus colli were mobilized bilaterally.  Self-retaining retractors were placed medially and laterally.   The microscope was draped and brought into the field.  Its use was essential to the performance of micro-neurosurgical technique.  I incised the disc space at C5-6.  I put in an intervertebral  and then using a 3 mm Matchstick with the electric Ismael drill under magnification I did a generous discectomy from uncovertebral joint to uncovertebral joint all the way down to the posterior longitudinal ligament.  The ligament was opened up sharply and the remainder of the bony and ligamentous removal was done with 1 and 2 mm angled Cloward punches.  Most decompression was on the right  with some osteophytic disc that was compressing the C6 root.  There almost seemed to be like a perineural cyst making the C6 root look rather bulbous, but I focused on removing osteophyte and disc material, and I could feel with a blunt nerve hook that the nerve root was completely decompressed.  I did the same thing on the left which also had osteophytic compression but not quite as much.   I then measured out for a 7 x 14 x 12 mm Titan TC cage which I impacted after it was stuffed with demineralized bone matrix.  I recessed it about 2 mm and then placed a 25 mm anterior cervical Elite plate using 4.0 x 0 x 14 mm screws with excellent purchase, 2 at C5 and 2 at C6, and tightening down the central locking screw.  I followed that with x-rays in the PA and lateral view which showed good position of the cage, the plate, and the screws.  Hemostasis was obtained.  FloSeal was used.  No drain was needed.  The platysma was reapproximated with 3-0 interrupted Vicryl suture.  The subcutaneous layer was closed with 3-0 interrupted Vicryl suture.  The skin was closed with running 4-0 Vicryl subcuticular.  Steri-Strips and a clean, dry dressing were placed.  A soft collar was placed.  She was extubated and taken to the recovery room in satisfactory condition.

## 2021-02-02 NOTE — ANESTHESIA POSTPROCEDURE EVALUATION
Patient: Tatiana Reynolds    Procedure Summary     Date: 02/02/21 Room / Location: [facility] [address] [place] [address] MAIN OR    Anesthesia Start: 0800 Anesthesia Stop: 1019    Procedure: CERVICAL 5-6 DISCECTOMY ANTERIOR WITH FUSION, WITH CAGES AND PLATE (N/A Spine Cervical) Diagnosis:       Cervical disc disorder at C5-C6 level with radiculopathy      Cervical disc disorder at C6-C7 level with radiculopathy      (Cervical disc disorder at C5-C6 level with radiculopathy [M50.122])      (Cervical disc disorder at C6-C7 level with radiculopathy [M50.123])    Surgeon: Neal Andrews MD Provider: Aren Kasper MD    Anesthesia Type: general ASA Status: 2          Anesthesia Type: general    Vitals  Vitals Value Taken Time   /85 02/02/21 1100   Temp 37 °C (98.6 °F) 02/02/21 1011   Pulse 74 02/02/21 1103   Resp 20 02/02/21 1045   SpO2 100 % 02/02/21 1103   Vitals shown include unvalidated device data.        Post Anesthesia Care and Evaluation    Patient location during evaluation: PACU  Patient participation: complete - patient participated  Level of consciousness: awake and alert  Pain management: adequate  Airway patency: patent  Anesthetic complications: No anesthetic complications    Cardiovascular status: acceptable  Respiratory status: acceptable  Hydration status: acceptable    Comments: --------------------            02/02/21               1045     --------------------   BP:       169/92     Pulse:      74       Resp:       20       Temp:                SpO2:      100%     --------------------

## 2021-02-03 LAB
ALBUMIN SERPL-MCNC: 3.3 G/DL (ref 3.5–5.2)
ALBUMIN/GLOB SERPL: 1.4 G/DL
ALP SERPL-CCNC: 70 U/L (ref 39–117)
ALT SERPL W P-5'-P-CCNC: 8 U/L (ref 1–33)
ANION GAP SERPL CALCULATED.3IONS-SCNC: 4 MMOL/L (ref 5–15)
ARTERIAL PATENCY WRIST A: POSITIVE
AST SERPL-CCNC: 13 U/L (ref 1–32)
ATMOSPHERIC PRESS: 748.9 MMHG
BASE EXCESS BLDA CALC-SCNC: 0.4 MMOL/L (ref 0–2)
BASOPHILS # BLD AUTO: 0.03 10*3/MM3 (ref 0–0.2)
BASOPHILS NFR BLD AUTO: 0.4 % (ref 0–1.5)
BDY SITE: NORMAL
BILIRUB SERPL-MCNC: 0.2 MG/DL (ref 0–1.2)
BUN SERPL-MCNC: 11 MG/DL (ref 6–20)
BUN/CREAT SERPL: 11.7 (ref 7–25)
CALCIUM SPEC-SCNC: 8.3 MG/DL (ref 8.6–10.5)
CHLORIDE SERPL-SCNC: 105 MMOL/L (ref 98–107)
CO2 SERPL-SCNC: 29 MMOL/L (ref 22–29)
CREAT SERPL-MCNC: 0.94 MG/DL (ref 0.57–1)
D-LACTATE SERPL-SCNC: 0.9 MMOL/L (ref 0.5–2)
DEPRECATED RDW RBC AUTO: 42.2 FL (ref 37–54)
EOSINOPHIL # BLD AUTO: 0.07 10*3/MM3 (ref 0–0.4)
EOSINOPHIL NFR BLD AUTO: 0.9 % (ref 0.3–6.2)
ERYTHROCYTE [DISTWIDTH] IN BLOOD BY AUTOMATED COUNT: 12.3 % (ref 12.3–15.4)
GFR SERPL CREATININE-BSD FRML MDRD: 61 ML/MIN/1.73
GLOBULIN UR ELPH-MCNC: 2.4 GM/DL
GLUCOSE BLDC GLUCOMTR-MCNC: 186 MG/DL (ref 70–130)
GLUCOSE SERPL-MCNC: 123 MG/DL (ref 65–99)
HCO3 BLDA-SCNC: 24.4 MMOL/L (ref 22–28)
HCT VFR BLD AUTO: 30.3 % (ref 34–46.6)
HGB BLD-MCNC: 9.6 G/DL (ref 12–15.9)
IMM GRANULOCYTES # BLD AUTO: 0.02 10*3/MM3 (ref 0–0.05)
IMM GRANULOCYTES NFR BLD AUTO: 0.2 % (ref 0–0.5)
LYMPHOCYTES # BLD AUTO: 2.59 10*3/MM3 (ref 0.7–3.1)
LYMPHOCYTES NFR BLD AUTO: 31.6 % (ref 19.6–45.3)
MAGNESIUM SERPL-MCNC: 2.2 MG/DL (ref 1.6–2.6)
MCH RBC QN AUTO: 29.5 PG (ref 26.6–33)
MCHC RBC AUTO-ENTMCNC: 31.7 G/DL (ref 31.5–35.7)
MCV RBC AUTO: 93.2 FL (ref 79–97)
MODALITY: NORMAL
MONOCYTES # BLD AUTO: 0.57 10*3/MM3 (ref 0.1–0.9)
MONOCYTES NFR BLD AUTO: 7 % (ref 5–12)
NEUTROPHILS NFR BLD AUTO: 4.91 10*3/MM3 (ref 1.7–7)
NEUTROPHILS NFR BLD AUTO: 59.9 % (ref 42.7–76)
NRBC BLD AUTO-RTO: 0 /100 WBC (ref 0–0.2)
PCO2 BLDA: 35.8 MM HG (ref 35–45)
PH BLDA: 7.44 PH UNITS (ref 7.35–7.45)
PLATELET # BLD AUTO: 212 10*3/MM3 (ref 140–450)
PMV BLD AUTO: 9.8 FL (ref 6–12)
PO2 BLDA: 88.5 MM HG (ref 80–100)
POTASSIUM SERPL-SCNC: 4.1 MMOL/L (ref 3.5–5.2)
PROCALCITONIN SERPL-MCNC: 0.07 NG/ML (ref 0–0.25)
PROT SERPL-MCNC: 5.7 G/DL (ref 6–8.5)
RBC # BLD AUTO: 3.25 10*6/MM3 (ref 3.77–5.28)
SAO2 % BLDCOA: 97.2 % (ref 92–99)
SODIUM SERPL-SCNC: 138 MMOL/L (ref 136–145)
TOTAL RATE: 20 BREATHS/MINUTE
WBC # BLD AUTO: 8.19 10*3/MM3 (ref 3.4–10.8)

## 2021-02-03 PROCEDURE — 80048 BASIC METABOLIC PNL TOTAL CA: CPT | Performed by: NURSE PRACTITIONER

## 2021-02-03 PROCEDURE — 93010 ELECTROCARDIOGRAM REPORT: CPT | Performed by: INTERNAL MEDICINE

## 2021-02-03 PROCEDURE — 86900 BLOOD TYPING SEROLOGIC ABO: CPT

## 2021-02-03 PROCEDURE — 86901 BLOOD TYPING SEROLOGIC RH(D): CPT

## 2021-02-03 PROCEDURE — 83605 ASSAY OF LACTIC ACID: CPT | Performed by: NURSE PRACTITIONER

## 2021-02-03 PROCEDURE — 83735 ASSAY OF MAGNESIUM: CPT | Performed by: NURSE PRACTITIONER

## 2021-02-03 PROCEDURE — 93005 ELECTROCARDIOGRAM TRACING: CPT | Performed by: NURSE PRACTITIONER

## 2021-02-03 PROCEDURE — 82962 GLUCOSE BLOOD TEST: CPT

## 2021-02-03 PROCEDURE — 36600 WITHDRAWAL OF ARTERIAL BLOOD: CPT

## 2021-02-03 PROCEDURE — 84145 PROCALCITONIN (PCT): CPT | Performed by: NURSE PRACTITIONER

## 2021-02-03 PROCEDURE — 85025 COMPLETE CBC W/AUTO DIFF WBC: CPT | Performed by: NURSE PRACTITIONER

## 2021-02-03 PROCEDURE — 80053 COMPREHEN METABOLIC PANEL: CPT | Performed by: NURSE PRACTITIONER

## 2021-02-03 PROCEDURE — 99024 POSTOP FOLLOW-UP VISIT: CPT | Performed by: NURSE PRACTITIONER

## 2021-02-03 PROCEDURE — 82803 BLOOD GASES ANY COMBINATION: CPT

## 2021-02-03 PROCEDURE — 85027 COMPLETE CBC AUTOMATED: CPT | Performed by: NURSE PRACTITIONER

## 2021-02-03 RX ORDER — POLYETHYLENE GLYCOL 3350 17 G/17G
17 POWDER, FOR SOLUTION ORAL DAILY
Status: DISCONTINUED | OUTPATIENT
Start: 2021-02-03 | End: 2021-02-05 | Stop reason: HOSPADM

## 2021-02-03 RX ORDER — METHOCARBAMOL 500 MG/1
500 TABLET, FILM COATED ORAL EVERY 8 HOURS SCHEDULED
Status: DISCONTINUED | OUTPATIENT
Start: 2021-02-03 | End: 2021-02-03

## 2021-02-03 RX ORDER — DOCUSATE SODIUM 100 MG/1
200 CAPSULE, LIQUID FILLED ORAL 2 TIMES DAILY
Status: DISCONTINUED | OUTPATIENT
Start: 2021-02-03 | End: 2021-02-05 | Stop reason: HOSPADM

## 2021-02-03 RX ORDER — SODIUM CHLORIDE 9 MG/ML
500 INJECTION, SOLUTION INTRAVENOUS ONCE
Status: COMPLETED | OUTPATIENT
Start: 2021-02-03 | End: 2021-02-03

## 2021-02-03 RX ADMIN — POLYETHYLENE GLYCOL 3350 17 G: 17 POWDER, FOR SOLUTION ORAL at 17:15

## 2021-02-03 RX ADMIN — MORPHINE SULFATE 15 MG: 15 TABLET, FILM COATED, EXTENDED RELEASE ORAL at 05:01

## 2021-02-03 RX ADMIN — MORPHINE SULFATE 15 MG: 15 TABLET, FILM COATED, EXTENDED RELEASE ORAL at 17:15

## 2021-02-03 RX ADMIN — SODIUM CHLORIDE 500 ML: 9 INJECTION, SOLUTION INTRAVENOUS at 17:29

## 2021-02-03 RX ADMIN — METHOCARBAMOL TABLETS 500 MG: 500 TABLET, COATED ORAL at 08:03

## 2021-02-03 RX ADMIN — TIZANIDINE 4 MG: 4 TABLET ORAL at 01:31

## 2021-02-03 RX ADMIN — MEDROXYPROGESTERONE ACETATE 2.5 MG: 2.5 TABLET ORAL at 08:03

## 2021-02-03 RX ADMIN — DOCUSATE SODIUM 200 MG: 100 CAPSULE, LIQUID FILLED ORAL at 20:59

## 2021-02-03 NOTE — PLAN OF CARE
Goal Outcome Evaluation:  Plan of Care Reviewed With: patient  Progress: declining  Outcome Summary: pt very drowsy today (states she didn't sleep well last night), ambulating stand by assist to BRP, pain better but D/Marlon PCA, bp low-hgb just came back 6.2 (call out to NP), educated on bp meds and monitoring

## 2021-02-03 NOTE — PROGRESS NOTES
NEUROSURGERY POST OP PROGRESS NOTE       LOS: 0 days   Patient Care Team:  Provider, No Known as PCP - General Riley, Barry Pino MD as Consulting Physician (Pain Medicine)  Virginia Ferro MD as Consulting Physician (Obstetrics and Gynecology)  Ray Currie MD as Consulting Physician (Vascular Surgery)  Jason Aviles III, MD as Consulting Physician (Psychiatry)  Anjali Ballard, RN as Ambulatory  ([facility])      Subjective       Interval History: Pre-op arm pain is better. Having neck pain/spasms, sore throat as expected. Chronic pain managed by Dr. Riley as an outpatient.     History taken from: patient chart    Objective        Vital Signs  Temp:  [97.3 °F (36.3 °C)-98.8 °F (37.1 °C)] 97.3 °F (36.3 °C)  Heart Rate:  [68-86] 70  Resp:  [16-18] 18  BP: ()/(52-76) 87/52     Physical Exam    AA&O x 3.   Anterior cervical incision well approximated. No swelling or drainage.   Voice quality normal.   Tolerating liquids, soft foods well.        Assessment/Plan       Cervical disc disorder at C6-C7 level with radiculopathy    Cervical disc disorder at C5-C6 level with radiculopathy    Cervical stenosis of spine      POD 1 C5-6 ACDF for bilateral cervical radiculopathy R>L  Chronic neck pain - sees Dr. Riley   All home pain meds continued  One episode of hypotension this am - observe  Robaxin for spasm relief - cannot give home dose SOMA (not on formulary)       ADDENDUM: 1720    PCA pump discontinued. Continues to have hypotension.   Consult [facility]  500 cc NS IV bolus  CBC, BMP now and in am      Lucero Frey, APRN  02/03/21  17:11 EST         Secondary Intention Text (Leave Blank If You Do Not Want): The defect will heal with secondary intention.

## 2021-02-03 NOTE — PLAN OF CARE
Goal Outcome Evaluation:  Plan of Care Reviewed With: patient  Progress: improving  Outcome Summary: patient ambulating with standby assistance to bathroom, voiding without difficulty, pain moderately controlled at this time, educated on b/p monitoring

## 2021-02-04 PROBLEM — I95.81 POSTOPERATIVE HYPOTENSION: Status: ACTIVE | Noted: 2021-02-04

## 2021-02-04 LAB
ANION GAP SERPL CALCULATED.3IONS-SCNC: 8.8 MMOL/L (ref 5–15)
ANION GAP SERPL CALCULATED.3IONS-SCNC: NORMAL MMOL/L
BASOPHILS # BLD AUTO: 0.03 10*3/MM3 (ref 0–0.2)
BASOPHILS NFR BLD AUTO: 0.4 % (ref 0–1.5)
BUN SERPL-MCNC: 8 MG/DL (ref 6–20)
BUN SERPL-MCNC: NORMAL MG/DL
BUN/CREAT SERPL: 10.5 (ref 7–25)
BUN/CREAT SERPL: NORMAL
CALCIUM SPEC-SCNC: 8.6 MG/DL (ref 8.6–10.5)
CALCIUM SPEC-SCNC: NORMAL MMOL/L
CHLORIDE SERPL-SCNC: 105 MMOL/L (ref 98–107)
CHLORIDE SERPL-SCNC: NORMAL MMOL/L
CO2 SERPL-SCNC: 25.2 MMOL/L (ref 22–29)
CO2 SERPL-SCNC: NORMAL MMOL/L
CREAT SERPL-MCNC: 0.76 MG/DL (ref 0.57–1)
CREAT SERPL-MCNC: NORMAL MG/DL
DEPRECATED RDW RBC AUTO: 39.2 FL (ref 37–54)
DEPRECATED RDW RBC AUTO: NORMAL FL
EOSINOPHIL # BLD AUTO: 0.1 10*3/MM3 (ref 0–0.4)
EOSINOPHIL NFR BLD AUTO: 1.3 % (ref 0.3–6.2)
ERYTHROCYTE [DISTWIDTH] IN BLOOD BY AUTOMATED COUNT: 12 % (ref 12.3–15.4)
ERYTHROCYTE [DISTWIDTH] IN BLOOD BY AUTOMATED COUNT: NORMAL %
FERRITIN SERPL-MCNC: 71.5 NG/ML (ref 13–150)
FOLATE SERPL-MCNC: 4.13 NG/ML (ref 4.78–24.2)
GFR SERPL CREATININE-BSD FRML MDRD: 78 ML/MIN/1.73
GFR SERPL CREATININE-BSD FRML MDRD: NORMAL ML/MIN/{1.73_M2}
GLUCOSE SERPL-MCNC: 104 MG/DL (ref 65–99)
GLUCOSE SERPL-MCNC: NORMAL MG/DL
HCT VFR BLD AUTO: 31.1 % (ref 34–46.6)
HCT VFR BLD AUTO: NORMAL %
HGB BLD-MCNC: 10 G/DL (ref 12–15.9)
HGB BLD-MCNC: NORMAL G/DL
IMM GRANULOCYTES # BLD AUTO: 0.03 10*3/MM3 (ref 0–0.05)
IMM GRANULOCYTES NFR BLD AUTO: 0.4 % (ref 0–0.5)
IRON 24H UR-MRATE: 14 MCG/DL (ref 37–145)
IRON SATN MFR SERPL: 5 % (ref 20–50)
LYMPHOCYTES # BLD AUTO: 2.88 10*3/MM3 (ref 0.7–3.1)
LYMPHOCYTES NFR BLD AUTO: 37 % (ref 19.6–45.3)
MCH RBC QN AUTO: 28.7 PG (ref 26.6–33)
MCH RBC QN AUTO: NORMAL PG
MCHC RBC AUTO-ENTMCNC: 32.2 G/DL (ref 31.5–35.7)
MCHC RBC AUTO-ENTMCNC: NORMAL G/DL
MCV RBC AUTO: 89.1 FL (ref 79–97)
MCV RBC AUTO: NORMAL FL
MONOCYTES # BLD AUTO: 0.45 10*3/MM3 (ref 0.1–0.9)
MONOCYTES NFR BLD AUTO: 5.8 % (ref 5–12)
NEUTROPHILS NFR BLD AUTO: 4.29 10*3/MM3 (ref 1.7–7)
NEUTROPHILS NFR BLD AUTO: 55.1 % (ref 42.7–76)
NRBC BLD AUTO-RTO: 0 /100 WBC (ref 0–0.2)
PLATELET # BLD AUTO: 239 10*3/MM3 (ref 140–450)
PLATELET # BLD AUTO: NORMAL 10*3/UL
PMV BLD AUTO: 9.9 FL (ref 6–12)
PMV BLD AUTO: NORMAL FL
POTASSIUM SERPL-SCNC: 3.8 MMOL/L (ref 3.5–5.2)
POTASSIUM SERPL-SCNC: NORMAL MMOL/L
QT INTERVAL: 395 MS
RBC # BLD AUTO: 3.49 10*6/MM3 (ref 3.77–5.28)
RBC # BLD AUTO: NORMAL 10*6/UL
SODIUM SERPL-SCNC: 139 MMOL/L (ref 136–145)
SODIUM SERPL-SCNC: NORMAL MMOL/L
TIBC SERPL-MCNC: 288 MCG/DL (ref 298–536)
TRANSFERRIN SERPL-MCNC: 193 MG/DL (ref 200–360)
VIT B12 BLD-MCNC: 213 PG/ML (ref 211–946)
WBC # BLD AUTO: 7.78 10*3/MM3 (ref 3.4–10.8)
WBC # BLD AUTO: NORMAL 10*3/UL

## 2021-02-04 PROCEDURE — 80048 BASIC METABOLIC PNL TOTAL CA: CPT | Performed by: NURSE PRACTITIONER

## 2021-02-04 PROCEDURE — 97161 PT EVAL LOW COMPLEX 20 MIN: CPT

## 2021-02-04 PROCEDURE — 82728 ASSAY OF FERRITIN: CPT | Performed by: STUDENT IN AN ORGANIZED HEALTH CARE EDUCATION/TRAINING PROGRAM

## 2021-02-04 PROCEDURE — 82607 VITAMIN B-12: CPT | Performed by: STUDENT IN AN ORGANIZED HEALTH CARE EDUCATION/TRAINING PROGRAM

## 2021-02-04 PROCEDURE — 82746 ASSAY OF FOLIC ACID SERUM: CPT | Performed by: STUDENT IN AN ORGANIZED HEALTH CARE EDUCATION/TRAINING PROGRAM

## 2021-02-04 PROCEDURE — 84466 ASSAY OF TRANSFERRIN: CPT | Performed by: STUDENT IN AN ORGANIZED HEALTH CARE EDUCATION/TRAINING PROGRAM

## 2021-02-04 PROCEDURE — 83540 ASSAY OF IRON: CPT | Performed by: STUDENT IN AN ORGANIZED HEALTH CARE EDUCATION/TRAINING PROGRAM

## 2021-02-04 PROCEDURE — 85025 COMPLETE CBC W/AUTO DIFF WBC: CPT | Performed by: NURSE PRACTITIONER

## 2021-02-04 RX ORDER — FLUCONAZOLE 150 MG/1
150 TABLET ORAL ONCE
Status: COMPLETED | OUTPATIENT
Start: 2021-02-04 | End: 2021-02-04

## 2021-02-04 RX ADMIN — MORPHINE SULFATE 15 MG: 15 TABLET, FILM COATED, EXTENDED RELEASE ORAL at 04:50

## 2021-02-04 RX ADMIN — DOCUSATE SODIUM 200 MG: 100 CAPSULE, LIQUID FILLED ORAL at 21:23

## 2021-02-04 RX ADMIN — POLYETHYLENE GLYCOL 3350 17 G: 17 POWDER, FOR SOLUTION ORAL at 08:53

## 2021-02-04 RX ADMIN — FLUCONAZOLE 150 MG: 150 TABLET ORAL at 21:22

## 2021-02-04 RX ADMIN — TIZANIDINE 4 MG: 4 TABLET ORAL at 22:57

## 2021-02-04 RX ADMIN — ZOLPIDEM TARTRATE 10 MG: 5 TABLET ORAL at 22:57

## 2021-02-04 RX ADMIN — DOCUSATE SODIUM 200 MG: 100 CAPSULE, LIQUID FILLED ORAL at 08:53

## 2021-02-04 RX ADMIN — MEDROXYPROGESTERONE ACETATE 2.5 MG: 2.5 TABLET ORAL at 08:55

## 2021-02-04 RX ADMIN — OXYCODONE HYDROCHLORIDE 30 MG: 15 TABLET ORAL at 21:31

## 2021-02-04 RX ADMIN — OXYCODONE HYDROCHLORIDE 30 MG: 15 TABLET ORAL at 12:05

## 2021-02-04 RX ADMIN — MORPHINE SULFATE 15 MG: 15 TABLET, FILM COATED, EXTENDED RELEASE ORAL at 17:46

## 2021-02-04 RX ADMIN — ESTRADIOL 1 PATCH: 0.05 PATCH, EXTENDED RELEASE TRANSDERMAL at 08:54

## 2021-02-04 RX ADMIN — TIZANIDINE 4 MG: 4 TABLET ORAL at 08:53

## 2021-02-04 NOTE — PROGRESS NOTES
Name: Tatiana Reynolds ADMIT: 2021   : 1965  PCP: Provider, No Known    MRN: [id number] LOS: 0 days   AGE/SEX: 56 y.o. female  ROOM: [facility]/     Subjective   Subjective   She denies any chest pain, SOA, nausea, vomiting or diarrhea.      Objective   Objective   Vital Signs  Temp:  [97.3 °F (36.3 °C)-98.9 °F (37.2 °C)] 97.5 °F (36.4 °C)  Heart Rate:  [59-82] 71  Resp:  [14-18] 16  BP: ()/(50-68) 82/52  SpO2:  [93 %-97 %] 93 %  on   ;   Device (Oxygen Therapy): room air  Body mass index is 22.79 kg/m².  Physical Exam  Vitals signs and nursing note reviewed.   Constitutional:       General: She is not in acute distress.     Appearance: Normal appearance. She is well-developed.   Neck:      Vascular: No JVD.      Trachea: No tracheal deviation.   Cardiovascular:      Rate and Rhythm: Normal rate and regular rhythm.      Heart sounds: No murmur.   Pulmonary:      Effort: Pulmonary effort is normal. No respiratory distress.      Breath sounds: Normal breath sounds.   Abdominal:      General: Bowel sounds are normal. There is no distension.      Palpations: Abdomen is soft.      Tenderness: There is no abdominal tenderness.   Skin:     General: Skin is warm and dry.   Neurological:      Mental Status: She is alert and oriented to person, place, and time.   Psychiatric:         Behavior: Behavior normal. Behavior is cooperative.         Results Review:       I reviewed the patient's new clinical results.  Results from last 7 days   Lab Units 2115 21  1411   WBC 10*3/mm3 7.78 8.19 8.32   HEMOGLOBIN g/dL 10.0* 9.6* 12.7   PLATELETS 10*3/mm3 239 212 293     Results from last 7 days   Lab Units 2115 21  1411   SODIUM mmol/L 139 138 137   POTASSIUM mmol/L 3.8 4.1 4.2   CHLORIDE mmol/L 105 105 99   CO2 mmol/L 25.2 29.0 24.5   BUN mg/dL 8 11 10   CREATININE mg/dL 0.76 0.94 0.91   GLUCOSE mg/dL 104* 123* 119*   Estimated Creatinine Clearance: 87.7  mL/min (by C-G formula based on SCr of 0.76 mg/dL).  Results from last 7 days   Lab Units 02/03/21 2014   ALBUMIN g/dL 3.30*   BILIRUBIN mg/dL 0.2   ALK PHOS U/L 70   AST (SGOT) U/L 13   ALT (SGPT) U/L 8     Results from last 7 days   Lab Units 02/04/21  0515 02/03/21 2014 01/29/21  1411   CALCIUM mg/dL 8.6 8.3* 10.3   ALBUMIN g/dL  --  3.30*  --    MAGNESIUM mg/dL  --  2.2  --      Results from last 7 days   Lab Units 02/03/21 2014   PROCALCITONIN ng/mL 0.07   LACTATE mmol/L 0.9     Glucose   Date/Time Value Ref Range Status   02/03/2021 1922 186 (H) 70 - 130 mg/dL Final       docusate sodium, 200 mg, Oral, BID  estradiol, 1 patch, Transdermal, Once per day on Mon Thu  medroxyPROGESTERone, 2.5 mg, Oral, Daily  Morphine, 15 mg, Oral, Q12H  polyethylene glycol, 17 g, Oral, Daily       Diet Regular       Assessment/Plan     Active Hospital Problems    Diagnosis  POA   • Postoperative hypotension [I95.89]  No   • Essential hypertension [I10]  Yes   • Opioid dependence (CMS/HCC) [F11.20]  Unknown   • Cervical stenosis of spine [M48.02]  Yes   • Cervical disc disorder at C5-C6 level with radiculopathy [M50.122]  Unknown   • Cervical disc disorder at C6-C7 level with radiculopathy [M50.123]  Unknown      Resolved Hospital Problems   No resolved problems to display.       Ms. Reynolds is a 56 y.o. female who is s/p CERVICAL 5-6 DISCECTOMY ANTERIOR WITH FUSION, WITH CAGES AND PLATE.    · BP has improved.  Continue monitoring.   · Labs essentially normal.    · Stable medically.       Chris Carpenter MD  [facility]  02/04/21  08:21 EST

## 2021-02-04 NOTE — THERAPY DISCHARGE NOTE
Acute Care - Physical Therapy Initial Evaluation/Discharge  [facility]     Patient Name: Tatiana Reynolds  : 1965  MRN: [id number]  Today's Date: 2021   Onset of Illness/Injury or Date of Surgery: 21     Referring Physician: Tk KEY      Admit Date: 2021    Visit Dx:    ICD-10-CM ICD-9-CM   1. Generalized weakness  R53.1 780.79     Patient Active Problem List   Diagnosis   • Cervical disc disorder at C6-C7 level with radiculopathy   • Degeneration of lumbar intervertebral disc   • Chronic bilateral low back pain with left-sided sciatica   • Chronic neck pain   • Postlaminectomy syndrome, lumbar region   • Cervical disc disorder at C5-C6 level with radiculopathy   • Cervical stenosis of spine   • Postoperative hypotension   • Essential hypertension   • Opioid dependence (CMS/HCC)     Past Medical History:   Diagnosis Date   • Anxiety    • Arthritis     thoracic   • Chronic back pain     managed by Dr. Barry Riley    • Chronic neck pain     managed by Dr. Barry Riley   • Essential hypertension    • Hyperlipidemia     borderline per patient    • Injury of back    • Migraine    • Palpitations    • Precordial pain    • Shortness of breath      Past Surgical History:   Procedure Laterality Date   • ANTERIOR CERVICAL DISCECTOMY W/ FUSION N/A 2021    Procedure: CERVICAL 5-6 DISCECTOMY ANTERIOR WITH FUSION, WITH CAGES AND PLATE;  Surgeon: Neal Andrews MD;  Location: [facility];  Service: Neurosurgery;  Laterality: N/A;   • BACK SURGERY      lumbar X 2   • BLADDER SUSPENSION     • BREAST AUGMENTATION Bilateral    •  SECTION     • DILATATION AND CURETTAGE     • FOOT SURGERY Right     bunionectormy, x2, bunion and 2nd toe   • LUMBAR SPINE SURGERY     • SHOULDER SURGERY      slap tear repair   • THORACIC SPINE SURGERY            PT Assessment (last 12 hours)      PT Evaluation and Treatment     Row Name 21 0900          Physical Therapy Time and Intention    Subjective  Information  no complaints  -     Document Type  discharge evaluation/summary  -     Mode of Treatment  individual therapy;physical therapy  -     Patient Effort  excellent  -     Symptoms Noted During/After Treatment  none  -     Row Name 02/04/21 0900          General Information    Patient Profile Reviewed  yes  -     Onset of Illness/Injury or Date of Surgery  02/03/21  -     Referring Physician  Tk KEY  -     Patient Observations  alert;cooperative;agree to therapy  -     General Observations of Patient  pt supine in bed no acute distress  -     Prior Level of Function  independent:  -     Equipment Currently Used at Home  none  -     Pertinent History of Current Functional Problem  POD 1 C spine sx  -     Existing Precautions/Restrictions  brace worn when out of bed;spinal cervical collar  -     Risks Reviewed  patient:  -     Benefits Reviewed  patient:  -     Row Name 02/04/21 0900          Vision Assessment/Intervention    Visual Impairment/Limitations  WNL  -     Row Name 02/04/21 0900          Cognition    Affect/Mental Status (Cognitive)  WFL  -     Orientation Status (Cognition)  oriented x 4  -     Row Name 02/04/21 0900          Pain Scale: Word Pre/Post-Treatment    Pain: Word Scale, Pretreatment  2 - mild pain  -     Posttreatment Pain Rating  2 - mild pain  -     Pain Location - Orientation  incisional  -     Pain Location  neck  -     Pain Intervention(s)  Repositioned;Ambulation/increased activity  -     Row Name 02/04/21 0900          Range of Motion Comprehensive    Comment, General Range of Motion  BLEs AROM WNL  -     Row Name 02/04/21 0900          Strength Comprehensive (MMT)    Comment, General Manual Muscle Testing (MMT) Assessment  BLEs grossly at least 4/5  -     Row Name 02/04/21 0900          Bed Mobility    Bed Mobility  supine-sit  -     Supine-Sit Sultan (Bed Mobility)  modified independence  -     Row Name 02/04/21  0900          Transfers    Transfers  sit-stand transfer;stand-sit transfer  -     Sit-Stand Hamilton (Transfers)  independent  -     Stand-Sit Hamilton (Transfers)  independent  -     Row Name 02/04/21 0900          Gait/Stairs (Locomotion)    Hamilton Level (Gait)  independent  -     Distance in Feet (Gait)  150  -LH     Pattern (Gait)  step-through  -     Hamilton Level (Stairs)  independent  -     Handrail Location (Stairs)  both sides  -     Number of Steps (Stairs)  4  -LH     Ascending Technique (Stairs)  step-over-step  -     Descending Technique (Stairs)  step-over-step  -LH     Row Name             Wound 02/02/21 anterior neck Incision    Wound - Properties Group Placement Date: 02/02/21  -SB Present on Hospital Admission: N  -SB Orientation: anterior  -SB Location: neck  -SB Primary Wound Type: Incision  -SB    Retired Wound - Properties Group Date first assessed: 02/02/21  -SB Present on Hospital Admission: N  -SB Location: neck  -SB Primary Wound Type: Incision  -SB    Row Name 02/04/21 0900          Plan of Care Review    Plan of Care Reviewed With  patient  -     Outcome Summary  Pt 57 yo female POD 1 C spine sx, Pt performs safe household ambulation and safely navgiated 4 steps reciprocally, independent level. No further skilled PT warranted, no DME needs. Encouraged pt to continue to mobilize acutely as ammy for generalized post op strengthening, pt receptive. Will sign off.   -[facility] Name 02/04/21 0900          Positioning and Restraints    Pre-Treatment Position  in bed  -     Post Treatment Position  chair  -     In Chair  sitting;call light within reach;encouraged to call for assist;exit alarm on;with nsg  -[facility] Name 02/04/21 0900          Therapy Assessment/Plan (PT)    Criteria for Skilled Interventions Met (PT)  no problems identified which require skilled intervention  -[facility] Name 02/04/21 0900          PT Evaluation Complexity    History,  PT Evaluation Complexity  1-2 personal factors and/or comorbidities  -     Examination of Body Systems (PT Eval Complexity)  1-2 elements  -     Clinical Presentation (PT Evaluation Complexity)  stable  -     Clinical Decision Making (PT Evaluation Complexity)  low complexity  -     Overall Complexity (PT Evaluation Complexity)  low complexity  -       User Key  (r) = Recorded By, (t) = Taken By, (c) = Cosigned By    Initials Name Provider Type     uLcero Travis, PT Physical Therapist    Leyla Baer, RN Registered Nurse          Physical Therapy Education                 Title: PT OT SLP Therapies (Done)     Topic: Physical Therapy (Done)     Point: Mobility training (Done)     Learning Progress Summary           Patient Acceptance, E, VU,DU by  at 2/4/2021 0913                   Point: Home exercise program (Done)     Learning Progress Summary           Patient Acceptance, E, VU,DU by  at 2/4/2021 0913                   Point: Body mechanics (Done)     Learning Progress Summary           Patient Acceptance, E, VU,DU by  at 2/4/2021 0913                   Point: Precautions (Done)     Learning Progress Summary           Patient Acceptance, E, VU,DU by  at 2/4/2021 0913                               User Key     Initials Effective Dates Name Provider Type Discipline     04/03/18 -  Lucero Travis, PT Physical Therapist PT                PT Recommendation and Plan  Anticipated Discharge Disposition (PT): home with assist  Therapy Frequency (PT): evaluation only  Plan of Care Reviewed With: patient  Outcome Summary: Pt 57 yo female POD 1 C spine sx, Pt performs safe household ambulation and safely navgiated 4 steps reciprocally, independent level. No further skilled PT warranted, no DME needs. Encouraged pt to continue to mobilize acutely as ammy for generalized post op strengthening, pt receptive. Will sign off.     Outcome Measures     Row Name 02/04/21 0900             How much help  from another person do you currently need...    Turning from your back to your side while in flat bed without using bedrails?  4  -LH      Moving from lying on back to sitting on the side of a flat bed without bedrails?  4  -LH      Moving to and from a bed to a chair (including a wheelchair)?  4  -LH      Standing up from a chair using your arms (e.g., wheelchair, bedside chair)?  4  -LH      Climbing 3-5 steps with a railing?  4  -LH      To walk in hospital room?  4  -      AM-PAC 6 Clicks Score (PT)  24  -         Functional Assessment    Outcome Measure Options  AM-PAC 6 Clicks Basic Mobility (PT)  -        User Key  (r) = Recorded By, (t) = Taken By, (c) = Cosigned By    Initials Name Provider Type     Lucero Travis, PT Physical Therapist           Time Calculation:   PT Charges     Row Name 02/04/21 0914             Time Calculation    Start Time  0840  -      Stop Time  0855  -      Time Calculation (min)  15 min  -      PT Received On  02/04/21  -        User Key  (r) = Recorded By, (t) = Taken By, (c) = Cosigned By    Initials Name Provider Type     Lucero Travis, PT Physical Therapist        Therapy Charges for Today     Code Description Service Date Service Provider Modifiers Qty    56138831139 HC PT EVAL LOW COMPLEXITY 2 2/4/2021 Lucero Travis, PT GP 1          PT G-Codes  Outcome Measure Options: AM-PAC 6 Clicks Basic Mobility (PT)  AM-PAC 6 Clicks Score (PT): 24    PT Discharge Summary  Anticipated Discharge Disposition (PT): home with assist    Lucero Travis PT  2/4/2021

## 2021-02-04 NOTE — CONSULTS
Patient Name:  Tatiana Reynolds  YOB: 1965  MRN:  [id number]  Date of Admission:  2/2/2021  Date of Consult:  2/3/2021  Patient Care Team:  Provider, No Known as PCP - General  Kaelynor, Barry Pino MD as Consulting Physician (Pain Medicine)  Virginia Ferro MD as Consulting Physician (Obstetrics and Gynecology)  Ray Currie MD as Consulting Physician (Vascular Surgery)  Jason Aviles III, MD as Consulting Physician (Psychiatry)  Anjali Ballard RN as Ambulatory  ([facility] [place])    Inpatient Internal Medicine Consult  Consult performed by: Trixie Lopez APRN  Consult ordered by: Lucero Frey APRN        Evaluate status and make recommendations regarding treatment for post-op hypotension.   Subjective   History of Present Illness  Ms. Reynolds is a 56 y.o. female that has been admitted to [facility] after ACDF on 2/2.  She has been admitted by the [facility] service and and we were asked to see and assist with her medical problems, specifically relating to her post-op hypotension. Patient reportedly had an episode of lethargy and hypotension today, labs were drawn and initially showed significant hypokalemia, hypocalcemia, and hemoglobin of 6.2. She was given IV fluid bolus and BP did improve. I had labs repeated given her inconsistent appearance with these labs and they were markedly better, suggesting these labs were resulted in error. Patient currently denies any fever, chest pain, shortness of breath, abdominal pain, changes in bowel or bladder habits, edema. She does report to me that she recently had an episode at home 3 weeks ago where patient reported generalized weakness, confusion, dizzy/lightheadedness, and hypotension while at work after standing. She states she was seen in ED and given IVF with improvement of BP and discharged. She reports that she has been off BP medications for surgery but thinks this is what caused her symptoms. She reports  these episodes have happened intermittently in the past but not in a while prior to the latest episode. She does not pass out, but states she feels like she will pass out upon standing sometimes. Patient looks well on exam and is in no acute distress.     Past Medical History:   Diagnosis Date   • Anxiety    • Arthritis     thoracic   • Chronic back pain     managed by Dr. Barry Riley    • Chronic neck pain     managed by Dr. Barry Riley   • Essential hypertension    • Hyperlipidemia     borderline per patient    • Injury of back    • Migraine    • Palpitations    • Precordial pain    • Shortness of breath      Past Surgical History:   Procedure Laterality Date   • ANTERIOR CERVICAL DISCECTOMY W/ FUSION N/A 2021    Procedure: CERVICAL 5-6 DISCECTOMY ANTERIOR WITH FUSION, WITH CAGES AND PLATE;  Surgeon: Neal Andrews MD;  Location: [facility];  Service: Neurosurgery;  Laterality: N/A;   • BACK SURGERY      lumbar X 2   • BLADDER SUSPENSION     • BREAST AUGMENTATION Bilateral    •  SECTION     • DILATATION AND CURETTAGE     • FOOT SURGERY Right     bunionectormy, x2, bunion and 2nd toe   • LUMBAR SPINE SURGERY     • SHOULDER SURGERY      slap tear repair   • THORACIC SPINE SURGERY       Family History   Problem Relation Age of Onset   • Diabetes Mother    • Hyperlipidemia Mother    • Cancer Brother         COLON   • Hypertension Brother    • Heart disease Brother    • Hypertension Brother    • Malig Hyperthermia Neg Hx      Social History     Tobacco Use   • Smoking status: Former Smoker     Packs/day: 0.50     Types: Cigarettes   • Smokeless tobacco: Never Used   • Tobacco comment: started at age 14; quit on 2017   Substance Use Topics   • Alcohol use: No     Comment: Very Little caffeine use   • Drug use: No     Types: Oxycodone     Comment: oxycodone prescribed by physician     Medications Prior to Admission   Medication Sig Dispense Refill Last Dose   • ALPRAZolam (XANAX) 0.5 MG tablet  Take 0.5 mg by mouth 2 (two) times a day as needed for anxiety.   2/2/2021 at 0500   • carisoprodol (SOMA) 350 MG tablet Take 350 mg by mouth 4 (four) times a day as needed for muscle spasms.   2/2/2021 at 0500   • estradiol (MINIVELLE, VIVELLE-DOT) 0.05 MG/24HR patch Place 1 patch on the skin as directed by provider 2 (Two) Times a Week.   2/2/2021 at Unknown time   • medroxyPROGESTERone (PROVERA) 2.5 MG tablet Take 2.5 mg by mouth daily.   2/2/2021 at 0500   • Morphine (MS CONTIN) 15 MG 12 hr tablet Take 15 mg by mouth 2 (Two) Times a Day.   2/2/2021 at 0500   • oxyCODONE (ROXICODONE) 30 MG immediate release tablet Take 30 mg by mouth every 6 (six) hours as needed for moderate pain (4-6).   2/2/2021 at 0500   • tiZANidine (ZANAFLEX) 4 MG tablet Take 4 mg by mouth every 8 (eight) hours as needed for muscle spasms.   2/1/2021 at 2330   • zolpidem (AMBIEN) 10 MG tablet Take 10 mg by mouth At Night As Needed for Sleep.   2/1/2021 at 2330   • lidocaine (LIDODERM) 5 % Place 1 patch on the skin every day. Remove & Discard patch within 12 hours or as directed by MD   Unknown at Unknown time     Allergies:  Patient has no known allergies.    Review of Systems   Constitutional: Positive for fatigue. Negative for chills and fever.   HENT: Negative.  Negative for congestion and sore throat.    Eyes: Negative.  Negative for visual disturbance.   Respiratory: Negative.  Negative for cough and shortness of breath.    Cardiovascular: Negative.  Negative for chest pain.   Gastrointestinal: Negative.  Negative for abdominal pain, nausea and vomiting.   Endocrine: Negative.    Genitourinary: Negative.  Negative for dysuria, frequency and urgency.   Musculoskeletal: Negative.  Negative for arthralgias and myalgias.   Skin: Negative.  Negative for color change and pallor.        Surgical wound to anterior neck covered   Allergic/Immunologic: Negative.    Neurological: Negative.  Negative for dizziness, weakness and light-headedness.    Hematological: Negative.    Psychiatric/Behavioral: Negative.  Negative for agitation, behavioral problems and confusion.       Objective      Vital Signs  Temp:  [97.3 °F (36.3 °C)-98.8 °F (37.1 °C)] 97.3 °F (36.3 °C)  Heart Rate:  [59-86] 59  Resp:  [16-18] 18  BP: ()/(52-75) 108/65  Body mass index is 22.79 kg/m².    Physical Exam  Vitals signs and nursing note reviewed.   Constitutional:       Appearance: Normal appearance. She is normal weight.   HENT:      Head: Normocephalic and atraumatic.      Mouth/Throat:      Mouth: Mucous membranes are moist.   Eyes:      Extraocular Movements: Extraocular movements intact.   Neck:      Comments: Neck brace in place  Cardiovascular:      Rate and Rhythm: Normal rate and regular rhythm.      Pulses: Normal pulses.      Heart sounds: Normal heart sounds.   Pulmonary:      Effort: Pulmonary effort is normal. No respiratory distress.      Breath sounds: Examination of the right-lower field reveals decreased breath sounds. Examination of the left-lower field reveals decreased breath sounds. Decreased breath sounds present.   Abdominal:      General: Abdomen is flat. Bowel sounds are normal. There is no distension.      Palpations: Abdomen is soft.   Musculoskeletal: Normal range of motion.         General: No swelling.   Skin:     General: Skin is warm and dry.   Neurological:      General: No focal deficit present.      Mental Status: She is alert and oriented to person, place, and time. Mental status is at baseline.   Psychiatric:         Mood and Affect: Mood normal.         Behavior: Behavior normal.         Thought Content: Thought content normal.         Judgment: Judgment normal.         Results Review:   I reviewed the patient's new clinical results.  I reviewed the patient's new imaging results and agree with the interpretation.  I reviewed the patient's other test results and agree with the interpretation  I personally viewed and interpreted the patient's  EKG/Telemetry data         Assessment/Plan     Active Hospital Problems    Diagnosis POA   • Cervical stenosis of spine [M48.02] Yes   • Cervical disc disorder at C5-C6 level with radiculopathy [M50.122] Unknown     Added automatically from request for surgery [id number]     • Cervical disc disorder at C6-C7 level with radiculopathy [M50.123] Unknown       -labs much improved with redraw, though hgb still 9.6, will check anemia labs  -hypotension and lethargy likely related to pain medication and anesthesia, on no antihypertensive medication at this time, will continue IVF    Thank you very much for asking A to be involved in this patient's care. We will follow along with you.      YESI Shea  [facility]  02/03/21  20:10 EST

## 2021-02-04 NOTE — PLAN OF CARE
VSS, A&Ox4, POD2 cervical spine surg, c/o pain mgmt chronic pain plan, BP improved w/ holding of narcotics, labs repeated and WDL (lab draw error suspected), [facility] contacted and admin of PRBC d/c, ice q4, voiding well, standby assist, d/c pending, ctm       Problem: Adult Inpatient Plan of Care  Goal: Plan of Care Review  Outcome: Ongoing, Progressing  Flowsheets (Taken 2/4/2021 0326)  Progress: improving  Plan of Care Reviewed With: patient  Goal: Patient-Specific Goal (Individualized)  Outcome: Ongoing, Progressing  Goal: Absence of Hospital-Acquired Illness or Injury  Outcome: Ongoing, Progressing  Intervention: Identify and Manage Fall Risk  Recent Flowsheet Documentation  Taken 2/4/2021 0211 by Jalyn Hoffmann RN  Safety Promotion/Fall Prevention: safety round/check completed  Taken 2/3/2021 2200 by Jalyn Hoffmann RN  Safety Promotion/Fall Prevention:   safety round/check completed   fall prevention program maintained  Intervention: Prevent Infection  Recent Flowsheet Documentation  Taken 2/4/2021 0211 by Jalyn Hoffmann RN  Infection Prevention: rest/sleep promoted  Taken 2/3/2021 2200 by Jalyn Hoffmann RN  Infection Prevention: rest/sleep promoted  Goal: Optimal Comfort and Wellbeing  Outcome: Ongoing, Progressing  Goal: Readiness for Transition of Care  Outcome: Ongoing, Progressing     Problem: Fall Injury Risk  Goal: Absence of Fall and Fall-Related Injury  Outcome: Ongoing, Progressing  Intervention: Promote Injury-Free Environment  Recent Flowsheet Documentation  Taken 2/4/2021 0211 by Jalyn Hoffmann RN  Safety Promotion/Fall Prevention: safety round/check completed  Taken 2/3/2021 2200 by Jalyn Hoffmann RN  Safety Promotion/Fall Prevention:   safety round/check completed   fall prevention program maintained     Problem: Functional Ability Impaired (Spinal Surgery)  Goal: Optimal Functional Ability  Outcome: Ongoing, Progressing     Problem: Ongoing Anesthesia Effects (Spinal  Surgery)  Goal: Anesthesia/Sedation Recovery  Outcome: Ongoing, Progressing  Intervention: Optimize Anesthesia Recovery  Recent Flowsheet Documentation  Taken 2/4/2021 0211 by Jalyn Hoffmann, RN  Safety Promotion/Fall Prevention: safety round/check completed  Taken 2/3/2021 2200 by Jalyn Hoffmann, RN  Safety Promotion/Fall Prevention:   safety round/check completed   fall prevention program maintained     Problem: Pain (Spinal Surgery)  Goal: Acceptable Pain Control  Outcome: Ongoing, Progressing     Problem: Hypertension Comorbidity  Goal: Blood Pressure in Desired Range  Outcome: Ongoing, Progressing     Problem: Pain Chronic (Persistent) (Comorbidity Management)  Goal: Acceptable Pain Control and Functional Ability  Outcome: Ongoing, Progressing   Goal Outcome Evaluation:  Plan of Care Reviewed With: patient  Progress: improving

## 2021-02-04 NOTE — PLAN OF CARE
Goal Outcome Evaluation:     Progress: improving  Outcome Summary: PT IS POD #2 FROM A C5-6 DISCECTOMY AND FUSION. PAN WELL CONTROLLED WITH PO PAN MEDS. AMBULATES  WITH ASSIST OF ONE AND A GAIT BELT. DOES C/O SORE THROAT BUT IS IMPROVING. PLANS TO DC HOME TO DAY OR TOMORROW PT EDUCATED REGARDING LOW B/P AND MANAGEMENT OF B/P S/P DISCHARGE.

## 2021-02-04 NOTE — PLAN OF CARE
Notified by RN that patient does not have a ride home because her boyfriend works at night.  Discharge will be held until tomorrow morning.  Patient also complaining of a yeast infection.  One-time dose of Diflucan 150 mg p.o. prescribed.

## 2021-02-04 NOTE — DISCHARGE SUMMARY
Tatiana Reynolds  1965    Patient Care Team:  Provider, No Known as PCP - Barry Riddle MD as Consulting Physician (Pain Medicine)  Virginia Ferro MD as Consulting Physician (Obstetrics and Gynecology)  Ray Currie MD as Consulting Physician (Vascular Surgery)  Jason Aviles III, MD as Consulting Physician (Psychiatry)  Anjali Ballard, RN as Ambulatory  ([facility])    Date of Admit: 2/2/2021    Date of Discharge:  2/4/2021    Discharge Diagnosis:  Cervical disc disorder at C6-C7 level with radiculopathy    Cervical disc disorder at C5-C6 level with radiculopathy    Cervical stenosis of spine    Postoperative hypotension    Essential hypertension    Opioid dependence (CMS/[facility])      Procedures Performed  Procedure(s):  CERVICAL 5-6 DISCECTOMY ANTERIOR WITH FUSION, WITH CAGES AND PLATE       Complications: None    Consultants:   Consults     Date and Time Order Name Status Description    2/3/2021 1725 Inpatient Internal Medicine Consult Completed     1/24/2021 1038 [facility] (on-call MD unless specified) Details Completed           Condition on Discharge: stable    Discharge disposition: home      Brief HPI: This is a 56-year-old female with a history of chronic pain.  In addition to her cervical spine issues she also has a history of chronic low back pain for postlaminectomy syndrome.  Her pain management physician is Dr. Barry Riley.  He has been prescribing her pain medications.  Patient was evaluated by Dr. Andrews for worsening chronic neck pain and bilateral arm pain right greater than left.  Imaging revealed osteophytic cervical disc herniation at C5-6 and to a lesser extent at C6-7.  Due to failure of conservative measures which included blocks, physical therapy, pain medications and time the patient was brought to the operating room electively for the above-stated surgical procedure.  Please see admission H&P for further details.      Hospital Course: The  patient has history of chronic pain as discussed above.  She has been maintained on her home regimen of pain medications.  For the first 24 hours, she was placed on a Dilaudid PCA pump.  She had some issues with pain the first 24hours but in the last 24 hours, this seemed to have improved.  The patient was having expected sore throat.  Voice quality was normal.  She is tolerating a regular diet with thin liquids without difficulty.  She had some postoperative hypotension throughout the day yesterday.  It was decided then that the PCA pump would be discontinued.  A hospitalist consult was requested and stat labs were drawn.  I was called by the nurse with an abnormal hemoglobin and hematocrit level of 6.2 and 19.0.  Given that she was hypotensive, a IV fluid bolus was requested as well as an internal medicine consult.  2 units of PRBCs were ordered as well.  When all of the laboratory studies from that specific blood draw seemed abnormal, a redraw was collected and the patient had a normal hemoglobin and hematocrit of 9.6 and 30.3.  White blood cell count 8.19.  The order for blood transfusion was therefore canceled.  The patient is voiding and tolerating a diet without difficulty.  She is ambulating well according to the physical therapy notes.  Today she reports improvement in the preoperative arm pain.  She is wearing the soft cervical collar.  The anterior cervical incision is well approximated.  The patient will be discharged home today.  She will not be prescribed any narcotic medications.  She will continue with the medications as prescribed by Dr. Riley, her pain management physician. Both the patient and Dr. Andrews are in agreement with the discharge plan.        Temp:  [97.3 °F (36.3 °C)-98.9 °F (37.2 °C)] 97.5 °F (36.4 °C)  Heart Rate:  [59-82] 71  Resp:  [14-18] 16  BP: ()/(50-92) 160/92    Current labs:  Lab Results (last 24 hours)     Procedure Component Value Units Date/Time    CBC (No Diff)  [[id number]] Collected: 02/03/21 1743    Specimen: Blood Updated: 02/04/21 0125     WBC --     Comment: Possible contamination, recollected to verify  Corrected result. Previous result was 6.29 10*3/mm3 on 2/3/2021 at 1839 EST.        RBC --     Comment: Possible contamination, recollected to verify  Corrected result. Previous result was 2.08 10*6/mm3 on 2/3/2021 at 1839 EST.        Hemoglobin --     Comment: Possible contamination, recollected to verify  Corrected result. Previous result was 6.2 g/dL on 2/3/2021 at 1839 EST.        Hematocrit --     Comment: Possible contamination, recollected to verify  Corrected result. Previous result was 19.0 % on 2/3/2021 at 1839 EST.        MCV --     Comment: Possible contamination, recollected to verify  Corrected result. Previous result was 91.3 fL on 2/3/2021 at 1839 EST.        MCH --     Comment: Possible contamination, recollected to verify  Corrected result. Previous result was 29.8 pg on 2/3/2021 at 1839 EST.        MCHC --     Comment: Possible contamination, recollected to verify  Corrected result. Previous result was 32.6 g/dL on 2/3/2021 at 1839 EST.        RDW --     Comment: Possible contamination, recollected to verify  Corrected result. Previous result was 12.0 % on 2/3/2021 at 1839 EST.        RDW-SD --     Comment: Possible contamination, recollected to verify  Corrected result. Previous result was 40.0 fl on 2/3/2021 at 1839 EST.        MPV --     Comment: Possible contamination, recollected to verify  Corrected result. Previous result was 10.0 fL on 2/3/2021 at 1839 EST.        Platelets --     Comment: Possible contamination, recollected to verify  Corrected result. Previous result was 136 10*3/mm3 on 2/3/2021 at 1839 EST.       Basic Metabolic Panel [[id number]] Collected: 02/03/21 1743    Specimen: Blood Updated: 02/04/21 0122     Glucose --     Comment: Possible contamination, recollected to verify  Corrected result. Previous result was 59 mg/dL on  2/3/2021 at [facility] EST.        BUN --     Comment: Possible contamination, recollected to verify  Corrected result. Previous result was 5 mg/dL on 2/3/2021 at [facility] EST.        Creatinine --     Comment: Possible contamination, recollected to verify  Corrected result. Previous result was 0.27 mg/dL on 2/3/2021 at [facility] EST.        Sodium --     Comment: Possible contamination, recollected to verify  Corrected result. Previous result was 145 mmol/L on 2/3/2021 at [facility] EST.        Potassium --     Comment: Possible contamination, recollected to verify  Corrected result. Previous result was 2.0 mmol/L on 2/3/2021 at [facility] EST.        Chloride --     Comment: Possible contamination, recollected to verify  Corrected result. Previous result was 129 mmol/L on 2/3/2021 at [facility] EST.        CO2 --     Comment: Possible contamination, recollected to verify  Corrected result. Previous result was 13.5 mmol/L on 2/3/2021 at [facility] EST.        Calcium --     Comment: Possible contamination, recollected to verify  Corrected result. Previous result was 3.6 mg/dL on 2/3/2021 at [facility] EST.        eGFR Non  Amer --     Comment: Corrected result. Previous result was >150 mL/min/1.73 on 2/3/2021 at [facility] EST.        BUN/Creatinine Ratio --     Comment: Possible contamination, recollected to verify  Corrected result. Previous result was 18.5 on 2/3/2021 at [facility] EST.        Anion Gap --     Comment: Possible contamination, recollected to verify  Corrected result. Previous result was 2.5 mmol/L on 2/3/2021 at [facility] EST.       Narrative:      GFR Normal >60  Chronic Kidney Disease <60  Kidney Failure <15      POC Glucose Once [[id number]]  (Abnormal) Collected: 02/03/21 1922    Specimen: Blood Updated: 02/03/21 1925     Glucose 186 mg/dL     CBC & Differential [[id number]]  (Abnormal) Collected: 02/03/21 2014    Specimen: Blood Updated: 02/03/21 2053    Narrative:      The following orders were created for panel order CBC &  Differential.  Procedure                               Abnormality         Status                     ---------                               -----------         ------                     CBC Auto Differential[[id number]]        Abnormal            Final result                 Please view results for these tests on the individual orders.    Comprehensive Metabolic Panel [[id number]]  (Abnormal) Collected: 02/03/21 2014    Specimen: Blood Updated: 02/03/21 2124     Glucose 123 mg/dL      BUN 11 mg/dL      Creatinine 0.94 mg/dL      Sodium 138 mmol/L      Potassium 4.1 mmol/L      Chloride 105 mmol/L      CO2 29.0 mmol/L      Calcium 8.3 mg/dL      Total Protein 5.7 g/dL      Albumin 3.30 g/dL      ALT (SGPT) 8 U/L      AST (SGOT) 13 U/L      Alkaline Phosphatase 70 U/L      Total Bilirubin 0.2 mg/dL      eGFR Non African Amer 61 mL/min/1.73      Globulin 2.4 gm/dL      A/G Ratio 1.4 g/dL      BUN/Creatinine Ratio 11.7     Anion Gap 4.0 mmol/L     Narrative:      GFR Normal >60  Chronic Kidney Disease <60  Kidney Failure <15      Magnesium [[id number]]  (Normal) Collected: 02/03/21 2014    Specimen: Blood Updated: 02/03/21 2121     Magnesium 2.2 mg/dL     Lactic Acid, Plasma [[id number]]  (Normal) Collected: 02/03/21 2014    Specimen: Blood Updated: 02/03/21 2119     Lactate 0.9 mmol/L     Procalcitonin [[id number]]  (Normal) Collected: 02/03/21 2014    Specimen: Blood Updated: 02/03/21 2128     Procalcitonin 0.07 ng/mL     Narrative:      As a Marker for Sepsis (Non-Neonates):   1. <0.5 ng/mL represents a low risk of severe sepsis and/or septic shock.  1. >2 ng/mL represents a high risk of severe sepsis and/or septic shock.    As a Marker for Lower Respiratory Tract Infections that require antibiotic therapy:  PCT on Admission     Antibiotic Therapy             6-12 Hrs later  > 0.5                Strongly Recommended            >0.25 - <0.5         Recommended  0.1 - 0.25           Discouraged                    "Remeasure/reassess PCT  <0.1                 Strongly Discouraged          Remeasure/reassess PCT      As 28 day mortality risk marker: \"Change in Procalcitonin Result\" (> 80 % or <=80 %) if Day 0 (or Day 1) and Day 4 values are available. Refer to http://[email or web address]-pct-[email or web address]/   Change in PCT <=80 %   A decrease of PCT levels below or equal to 80 % defines a positive change in PCT test result representing a higher risk for 28-day all-cause mortality of patients diagnosed with severe sepsis or septic shock.  Change in PCT > 80 %   A decrease of PCT levels of more than 80 % defines a negative change in PCT result representing a lower risk for 28-day all-cause mortality of patients diagnosed with severe sepsis or septic shock.                Results may be falsely decreased if patient taking Biotin.     CBC Auto Differential [[id number]]  (Abnormal) Collected: 02/03/21 2014    Specimen: Blood Updated: 02/03/21 2053     WBC 8.19 10*3/mm3      RBC 3.25 10*6/mm3      Hemoglobin 9.6 g/dL      Hematocrit 30.3 %      MCV 93.2 fL      MCH 29.5 pg      MCHC 31.7 g/dL      RDW 12.3 %      RDW-SD 42.2 fl      MPV 9.8 fL      Platelets 212 10*3/mm3      Neutrophil % 59.9 %      Lymphocyte % 31.6 %      Monocyte % 7.0 %      Eosinophil % 0.9 %      Basophil % 0.4 %      Immature Grans % 0.2 %      Neutrophils, Absolute 4.91 10*3/mm3      Lymphocytes, Absolute 2.59 10*3/mm3      Monocytes, Absolute 0.57 10*3/mm3      Eosinophils, Absolute 0.07 10*3/mm3      Basophils, Absolute 0.03 10*3/mm3      Immature Grans, Absolute 0.02 10*3/mm3      nRBC 0.0 /100 WBC     Blood Gas, Arterial - [[id number]] Collected: 02/03/21 2034    Specimen: Arterial Blood Updated: 02/03/21 2037     Site Arterial: left radial     Joaquin's Test Positive     pH, Arterial 7.442 pH units      pCO2, Arterial 35.8 mm Hg      pO2, Arterial 88.5 mm Hg      HCO3, Arterial 24.4 mmol/L      Base Excess, Arterial 0.4 mmol/L      O2 Saturation Calculated 97.2 %     "  Barometric Pressure for Blood Gas 748.9 mmHg      Modality Room Air     Rate 20 Breaths/minute     Basic Metabolic Panel [[id number]]  (Abnormal) Collected: 02/04/21 0515    Specimen: Blood Updated: 02/04/21 0748     Glucose 104 mg/dL      BUN 8 mg/dL      Creatinine 0.76 mg/dL      Sodium 139 mmol/L      Potassium 3.8 mmol/L      Chloride 105 mmol/L      CO2 25.2 mmol/L      Calcium 8.6 mg/dL      eGFR Non African Amer 78 mL/min/1.73      BUN/Creatinine Ratio 10.5     Anion Gap 8.8 mmol/L     Narrative:      GFR Normal >60  Chronic Kidney Disease <60  Kidney Failure <15      CBC & Differential [[id number]]  (Abnormal) Collected: 02/04/21 0515    Specimen: Blood Updated: 02/04/21 0819    Narrative:      The following orders were created for panel order CBC & Differential.  Procedure                               Abnormality         Status                     ---------                               -----------         ------                     CBC Auto Differential[[id number]]        Abnormal            Final result                 Please view results for these tests on the individual orders.    CBC Auto Differential [[id number]]  (Abnormal) Collected: 02/04/21 0515    Specimen: Blood Updated: 02/04/21 0819     WBC 7.78 10*3/mm3      RBC 3.49 10*6/mm3      Hemoglobin 10.0 g/dL      Hematocrit 31.1 %      MCV 89.1 fL      MCH 28.7 pg      MCHC 32.2 g/dL      RDW 12.0 %      RDW-SD 39.2 fl      MPV 9.9 fL      Platelets 239 10*3/mm3      Neutrophil % 55.1 %      Lymphocyte % 37.0 %      Monocyte % 5.8 %      Eosinophil % 1.3 %      Basophil % 0.4 %      Immature Grans % 0.4 %      Neutrophils, Absolute 4.29 10*3/mm3      Lymphocytes, Absolute 2.88 10*3/mm3      Monocytes, Absolute 0.45 10*3/mm3      Eosinophils, Absolute 0.10 10*3/mm3      Basophils, Absolute 0.03 10*3/mm3      Immature Grans, Absolute 0.03 10*3/mm3      nRBC 0.0 /100 WBC     Ferritin [[id number]]  (Normal) Collected: 02/04/21 0515    Specimen: Blood  Updated: 02/04/21 0735     Ferritin 71.50 ng/mL     Narrative:      Results may be falsely decreased if patient taking Biotin.      Iron Profile [[id number]]  (Abnormal) Collected: 02/04/21 0515    Specimen: Blood Updated: 02/04/21 0732     Iron 14 mcg/dL      Iron Saturation 5 %      Transferrin 193 mg/dL      TIBC 288 mcg/dL     Vitamin B12 [[id number]]  (Normal) Collected: 02/04/21 0515    Specimen: Blood Updated: 02/04/21 0743     Vitamin B-12 213 pg/mL     Narrative:      Results may be falsely increased if patient taking Biotin.      Folate [[id number]]  (Abnormal) Collected: 02/04/21 0515    Specimen: Blood Updated: 02/04/21 0743     Folate 4.13 ng/mL     Narrative:      Results may be falsely increased if patient taking Biotin.          Discharge Medications  Amador has been reviewed and narcotic consent is on file in the patient's chart.     Your medication list      CONTINUE taking these medications      Instructions Last Dose Given Next Dose Due   ALPRAZolam 0.5 MG tablet  Commonly known as: XANAX      Take 0.5 mg by mouth 2 (two) times a day as needed for anxiety.       Ambien 10 MG tablet  Generic drug: zolpidem      Take 10 mg by mouth At Night As Needed for Sleep.       carisoprodol 350 MG tablet  Commonly known as: SOMA      Take 350 mg by mouth 4 (four) times a day as needed for muscle spasms.       estradiol 0.05 MG/24HR patch  Commonly known as: MINIZAYNAB COOK-DOT      Place 1 patch on the skin as directed by provider 2 (Two) Times a Week.       lidocaine 5 %  Commonly known as: LIDODERM      Place 1 patch on the skin every day. Remove & Discard patch within 12 hours or as directed by MD       medroxyPROGESTERone 2.5 MG tablet  Commonly known as: PROVERA      Take 2.5 mg by mouth daily.       Morphine 15 MG 12 hr tablet  Commonly known as: MS CONTIN      Take 15 mg by mouth 2 (Two) Times a Day.       oxyCODONE 30 MG immediate release tablet  Commonly known as: ROXICODONE      Take 30 mg by mouth  "every 6 (six) hours as needed for moderate pain (4-6).       tiZANidine 4 MG tablet  Commonly known as: ZANAFLEX      Take 4 mg by mouth every 8 (eight) hours as needed for muscle spasms.              Discharge Diet:   Diet Instructions     Diet: Regular, Soft Texture; Thin Liquids, No Restrictions; Chopped      Discharge Diet:  Regular  Soft Texture       Fluid Consistency: Thin Liquids, No Restrictions    Soft Options: Chopped        Diet Order   Procedures   • Diet Regular       Activity at Discharge:       Call for: questions or concerns    Follow-up Appointments  Future Appointments   Date Time Provider Department Center   2/17/2021 12:00 PM Neal Andrews MD [facility]     Follow-up Information     Provider, No Known .    Contact information:  [place] [id number] [phone]                 Additional Instructions for the Follow-ups that You Need to Schedule     Discharge Follow-up with Specialty: Dr. Andrews's office; 2 Weeks   As directed      Specialty: Dr. Andrews's office    Follow Up: 2 Weeks    Follow Up Details: Keep post op appointment as previously arranged.               Test Results Pending at Discharge     None    I discussed the discharge instructions with patient    Lucero Frey, APRN  02/04/21  16:05 EST    \"Dictated utilizing Dragon dictation\".        ADDENDUM:    Although discharged, patient remained in the hospital overnight February 4, 2021 due to the fact that she had no transportation home.  She was discharged February 5, 2020 prior to being seen.  "

## 2021-02-04 NOTE — NURSING NOTE
2 units PRBC ordered by Lucero Frey for hgb 6.2. Other criticals received at 1920 K-2.0, Ca 3.6 and glucose 59. Call placed to [facility] to notify.  Lab redraw ordered to verify accuracy. Glucose checked at bedside and was 186. Awaiting call back from MD.

## 2021-02-04 NOTE — PLAN OF CARE
Goal Outcome Evaluation:  Plan of Care Reviewed With: patient     Outcome Summary: Pt 57 yo female POD 1 C spine sx, Pt performs safe household ambulation and safely navgiated 4 steps reciprocally, independent level. No further skilled PT warranted, no DME needs. Encouraged pt to continue to mobilize acutely as ammy for generalized post op strengthening, pt receptive. Will sign off.       ..Patient was wearing a face mask during this therapy encounter. Therapist used appropriate personal protective equipment including eye protection, mask, and gloves.  Mask used was standard procedure mask. Appropriate PPE was worn during the entire therapy session. Hand hygiene was completed before and after therapy session. Patient is not in enhanced droplet precautions.

## 2021-02-05 VITALS
WEIGHT: 149.91 LBS | DIASTOLIC BLOOD PRESSURE: 79 MMHG | RESPIRATION RATE: 16 BRPM | SYSTOLIC BLOOD PRESSURE: 129 MMHG | HEIGHT: 68 IN | OXYGEN SATURATION: 98 % | BODY MASS INDEX: 22.72 KG/M2 | HEART RATE: 53 BPM | TEMPERATURE: 97 F

## 2021-02-05 LAB
RETICS # AUTO: 0.04 10*6/MM3 (ref 0.02–0.13)
RETICS/RBC NFR AUTO: 1.02 % (ref 0.7–1.9)

## 2021-02-05 PROCEDURE — 85045 AUTOMATED RETICULOCYTE COUNT: CPT | Performed by: STUDENT IN AN ORGANIZED HEALTH CARE EDUCATION/TRAINING PROGRAM

## 2021-02-05 RX ADMIN — OXYCODONE HYDROCHLORIDE 30 MG: 15 TABLET ORAL at 04:01

## 2021-02-05 RX ADMIN — OXYCODONE HYDROCHLORIDE 30 MG: 15 TABLET ORAL at 12:47

## 2021-02-05 RX ADMIN — DOCUSATE SODIUM 200 MG: 100 CAPSULE, LIQUID FILLED ORAL at 08:53

## 2021-02-05 RX ADMIN — POLYETHYLENE GLYCOL 3350 17 G: 17 POWDER, FOR SOLUTION ORAL at 08:53

## 2021-02-05 RX ADMIN — MORPHINE SULFATE 15 MG: 15 TABLET, FILM COATED, EXTENDED RELEASE ORAL at 05:51

## 2021-02-05 RX ADMIN — MEDROXYPROGESTERONE ACETATE 2.5 MG: 2.5 TABLET ORAL at 08:53

## 2021-02-05 NOTE — PROGRESS NOTES
Continued Stay Note  [facility]     Patient Name: Tatiana Reynolds  MRN: [id number]  Today's Date: 2/5/2021    Admit Date: 2/2/2021    Discharge Plan     Row Name 02/05/21 1257       Plan    Final Discharge Disposition Code  01 - home or self-care    Final Note  Pt d/c'ed home        Discharge Codes    No documentation.       Expected Discharge Date and Time     Expected Discharge Date Expected Discharge Time    Feb 4, 2021             Saundra Sotomayor RN

## 2021-02-05 NOTE — PLAN OF CARE
Goal Outcome Evaluation:  Plan of Care Reviewed With: patient  Progress: improving  Outcome Summary: Pt remains stable. Ambulates with stand by assist, soft collar at bedside. Dressing is CDI. Voiding per BRP, no BM this admission continues on bowel regimen. C/O possible yeast infection, med given x1 dose. Pain well controlled with scheduled and PRN meds. Education provided on htn management with meds and BP monitoring. Plans to d/c home today. Will continue to monitor.

## 2021-02-07 LAB
BH BB BLOOD EXPIRATION DATE: NORMAL
BH BB BLOOD EXPIRATION DATE: NORMAL
BH BB BLOOD TYPE BARCODE: 5100
BH BB BLOOD TYPE BARCODE: 5100
BH BB DISPENSE STATUS: NORMAL
BH BB DISPENSE STATUS: NORMAL
BH BB PRODUCT CODE: NORMAL
BH BB PRODUCT CODE: NORMAL
BH BB UNIT NUMBER: NORMAL
BH BB UNIT NUMBER: NORMAL
CROSSMATCH INTERPRETATION: NORMAL
CROSSMATCH INTERPRETATION: NORMAL
UNIT  ABO: NORMAL
UNIT  ABO: NORMAL
UNIT  RH: NORMAL
UNIT  RH: NORMAL

## 2021-02-17 ENCOUNTER — OFFICE VISIT (OUTPATIENT)
Dept: NEUROSURGERY | Facility: CLINIC | Age: 56
End: 2021-02-17

## 2021-02-17 VITALS
DIASTOLIC BLOOD PRESSURE: 96 MMHG | HEIGHT: 68 IN | SYSTOLIC BLOOD PRESSURE: 148 MMHG | WEIGHT: 150 LBS | TEMPERATURE: 99.8 F | BODY MASS INDEX: 22.73 KG/M2

## 2021-02-17 DIAGNOSIS — Z48.89 POSTOPERATIVE VISIT: Primary | ICD-10-CM

## 2021-02-17 PROCEDURE — 99024 POSTOP FOLLOW-UP VISIT: CPT | Performed by: NEUROLOGICAL SURGERY

## 2021-02-17 RX ORDER — MORPHINE SULFATE 30 MG/1
30 TABLET, FILM COATED, EXTENDED RELEASE ORAL 2 TIMES DAILY
COMMUNITY
Start: 2021-02-02

## 2021-02-17 RX ORDER — AMLODIPINE BESYLATE 10 MG/1
TABLET ORAL
COMMUNITY
Start: 2021-02-13

## 2021-02-17 RX ORDER — PREGABALIN 75 MG/1
75 CAPSULE ORAL 2 TIMES DAILY
COMMUNITY
Start: 2020-12-21

## 2021-02-17 NOTE — PROGRESS NOTES
Subjective   Patient ID: Tatiana Reynolds is a 56 y.o. female is here today for follow-up.  Patient had CERVICAL 5-6 DISCECTOMY ANTERIOR WITH FUSION, WITH CAGES AND PLATE on 2.2.21.      Patient is here postsurgery. Patient is still having neck pain, radiating to right shoulder, right arm, to right hand, to right 3rd, 4th, 5th digits. Numbness and tingling are present.    Its been a little over 2 weeks since she underwent an ACDF at C5-C6 with a cage and plate.  I was originally going to do 2 levels but I felt at the time of the surgery that C5-C6 at the most compression and can find it at that level.  She had been having pain for years I am not surprised that there is some residual symptoms.  The collar was removed.  The incision looks fine.  The Steri-Strips were removed.  She still has pain in her neck and her arms.  I told her it will simply take some time particular in her case since before surgery this has been going on for a long time.  She is back on her pain medications from her pain physician consisting of long-acting morphine as well as Soma and Tizanidine.  I think it is too early to start therapy.  But I think she needed to rethink her 6-week timeframe in terms of going back to work.  She said that she could take up to 12 weeks but she would probably lose her shift on Wednesday on the weekends which she did not want to but she might have to rethink that notion.  We will have her come back in 2 weeks with x-rays.  The collar can come off.  We will decide if formal PT is needed.  She can also drive.      Neck Pain   The pain is present in the right side. The quality of the pain is described as shooting and burning. The pain is at a severity of 8/10. The pain is same all the time. Associated symptoms include headaches, numbness, pain with swallowing, tingling and trouble swallowing. Pertinent negatives include no fever. She has tried ice and heat for the symptoms. The treatment provided mild relief.  "      The following portions of the patient's history were reviewed and updated as appropriate: allergies, current medications, past family history, past medical history, past social history, past surgical history and problem list.    Review of Systems   Constitutional: Negative for chills and fever.   HENT: Positive for trouble swallowing. Negative for congestion.    Musculoskeletal: Positive for neck pain.   Neurological: Positive for tingling, numbness and headaches.   All other systems reviewed and are negative.          Objective     Vitals:    02/17/21 1247   BP: 148/96   Temp: 99.8 °F (37.7 °C)   Weight: 68 kg (150 lb)   Height: 172.7 cm (68\")     Body mass index is 22.81 kg/m².      Physical Exam  Constitutional:       Appearance: She is well-developed.   HENT:      Head: Normocephalic and atraumatic.   Eyes:      Extraocular Movements: EOM normal.      Conjunctiva/sclera: Conjunctivae normal.      Pupils: Pupils are equal, round, and reactive to light.   Neck:      Vascular: No carotid bruit.   Neurological:      Mental Status: She is oriented to person, place, and time.      Coordination: Finger-Nose-Finger Test and Heel to Shin Test normal.      Gait: Gait is intact.      Deep Tendon Reflexes:      Reflex Scores:       Tricep reflexes are 2+ on the right side and 2+ on the left side.       Bicep reflexes are 2+ on the right side and 2+ on the left side.       Brachioradialis reflexes are 2+ on the right side and 2+ on the left side.       Patellar reflexes are 2+ on the right side and 2+ on the left side.       Achilles reflexes are 2+ on the right side and 2+ on the left side.  Psychiatric:         Speech: Speech normal.       Neurologic Exam     Mental Status   Oriented to person, place, and time.   Registration of memory: Good recent and remote memory.   Attention: normal. Concentration: normal.   Speech: speech is normal   Level of consciousness: alert  Knowledge: consistent with education. "     Cranial Nerves     CN II   Visual fields full to confrontation.   Visual acuity: normal    CN III, IV, VI   Pupils are equal, round, and reactive to light.  Extraocular motions are normal.     CN V   Facial sensation intact.   Right corneal reflex: normal  Left corneal reflex: normal    CN VII   Facial expression full, symmetric.   Right facial weakness: none  Left facial weakness: none    CN VIII   Hearing: intact    CN IX, X   Palate: symmetric    CN XI   Right sternocleidomastoid strength: normal  Left sternocleidomastoid strength: normal    CN XII   Tongue: not atrophic  Tongue deviation: none    Motor Exam   Muscle bulk: normal  Right arm tone: normal  Left arm tone: normal  Right leg tone: normal  Left leg tone: normal    Strength   Strength 5/5 except as noted.     Sensory Exam   Light touch normal.     Gait, Coordination, and Reflexes     Gait  Gait: normal    Coordination   Finger to nose coordination: normal  Heel to shin coordination: normal    Reflexes   Right brachioradialis: 2+  Left brachioradialis: 2+  Right biceps: 2+  Left biceps: 2+  Right triceps: 2+  Left triceps: 2+  Right patellar: 2+  Left patellar: 2+  Right achilles: 2+  Left achilles: 2+  Right : 2+  Left : 2+          Assessment/Plan   Independent Review of Radiographic Studies:      I personally reviewed the images from the following studies.        Medical Decision Making:      Overall doing well but still having pain.  Not unexpected given the longevity of the symptoms before surgery.  The collar can come off.  She can drive.  We will get x-rays before the next visit in 2 weeks.  I told her to do her own exercises.  We can talk about starting formal therapy if needed next time.      Diagnoses and all orders for this visit:    1. Postoperative visit (Primary)  -     XR spine cervical ap and lat w flex and ext; Future      Return in about 2 weeks (around 3/3/2021) for Face-to-face.

## 2021-03-12 ENCOUNTER — OFFICE VISIT (OUTPATIENT)
Dept: NEUROSURGERY | Facility: CLINIC | Age: 56
End: 2021-03-12

## 2021-03-12 ENCOUNTER — HOSPITAL ENCOUNTER (OUTPATIENT)
Dept: GENERAL RADIOLOGY | Facility: HOSPITAL | Age: 56
Discharge: HOME | End: 2021-03-12
Admitting: NEUROLOGICAL SURGERY

## 2021-03-12 VITALS
WEIGHT: 150 LBS | SYSTOLIC BLOOD PRESSURE: 142 MMHG | BODY MASS INDEX: 22.73 KG/M2 | HEIGHT: 68 IN | DIASTOLIC BLOOD PRESSURE: 88 MMHG | TEMPERATURE: 98.2 F

## 2021-03-12 DIAGNOSIS — Z48.89 POSTOPERATIVE VISIT: Primary | ICD-10-CM

## 2021-03-12 DIAGNOSIS — Z48.89 POSTOPERATIVE VISIT: ICD-10-CM

## 2021-03-12 PROCEDURE — 99024 POSTOP FOLLOW-UP VISIT: CPT | Performed by: NEUROLOGICAL SURGERY

## 2021-03-12 PROCEDURE — 72050 X-RAY EXAM NECK SPINE 4/5VWS: CPT

## 2021-03-12 NOTE — PROGRESS NOTES
"Subjective   Patient ID: Tatiana Reynolds is a 56 y.o. female is here today for follow-up. Patient had CERVICAL 5-6 DISCECTOMY ANTERIOR WITH FUSION, WITH CAGES AND PLATE 2/2/21    Patient is here today for a postop.  She states she has numbness in her right arm and right hand.    She is a little over 5 weeks out from an ACDF at C5-C6.  The pain in the arms are much better although she still has a lot of numbness and tingling.  The incision is fine.  We talked about trying some physical therapy but she feels comfortable doing on her own and so I asked her to do that on her own in preparation for work return.  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  We will let her do that without restrictions as of 4/3/2021 and have her come back in 2 months.    I think she is ready for this at work and she is motivated to get back to the same job.      Neck Pain   The quality of the pain is described as aching and shooting. The pain is at a severity of 6/10. Nothing aggravates the symptoms. The pain is same all the time. Associated symptoms include numbness and tingling. Pertinent negatives include no fever or headaches. She has tried ice for the symptoms. The treatment provided mild relief.   Arm Pain   Associated symptoms include numbness and tingling.       The following portions of the patient's history were reviewed and updated as appropriate: allergies, current medications, past family history, past medical history, past social history, past surgical history and problem list.    Review of Systems   Constitutional: Negative for chills and fever.   HENT: Negative for congestion.    Musculoskeletal: Positive for neck pain.   Neurological: Positive for tingling and numbness. Negative for headaches.   All other systems reviewed and are negative.          Objective     Vitals:    03/12/21 1519   BP: 142/88   Temp: 98.2 °F (36.8 °C)   Weight: 68 kg (150 lb)   Height: 172.7 cm (68\")     Body mass index is 22.81 " kg/m².      Physical Exam  Constitutional:       Appearance: She is well-developed.   HENT:      Head: Normocephalic and atraumatic.   Eyes:      Extraocular Movements: EOM normal.      Conjunctiva/sclera: Conjunctivae normal.      Pupils: Pupils are equal, round, and reactive to light.   Neck:      Vascular: No carotid bruit.   Neurological:      Mental Status: She is oriented to person, place, and time.      Coordination: Finger-Nose-Finger Test and Heel to Shin Test normal.      Gait: Gait is intact.      Deep Tendon Reflexes:      Reflex Scores:       Tricep reflexes are 2+ on the right side and 2+ on the left side.       Bicep reflexes are 2+ on the right side and 2+ on the left side.       Brachioradialis reflexes are 2+ on the right side and 2+ on the left side.       Patellar reflexes are 2+ on the right side and 2+ on the left side.       Achilles reflexes are 2+ on the right side and 2+ on the left side.  Psychiatric:         Speech: Speech normal.       Neurologic Exam     Mental Status   Oriented to person, place, and time.   Registration of memory: Good recent and remote memory.   Attention: normal. Concentration: normal.   Speech: speech is normal   Level of consciousness: alert  Knowledge: consistent with education.     Cranial Nerves     CN II   Visual fields full to confrontation.   Visual acuity: normal    CN III, IV, VI   Pupils are equal, round, and reactive to light.  Extraocular motions are normal.     CN V   Facial sensation intact.   Right corneal reflex: normal  Left corneal reflex: normal    CN VII   Facial expression full, symmetric.   Right facial weakness: none  Left facial weakness: none    CN VIII   Hearing: intact    CN IX, X   Palate: symmetric    CN XI   Right sternocleidomastoid strength: normal  Left sternocleidomastoid strength: normal    CN XII   Tongue: not atrophic  Tongue deviation: none    Motor Exam   Muscle bulk: normal  Right arm tone: normal  Left arm tone: normal  Right  leg tone: normal  Left leg tone: normal    Strength   Strength 5/5 except as noted.     Sensory Exam   Light touch normal.     Gait, Coordination, and Reflexes     Gait  Gait: normal    Coordination   Finger to nose coordination: normal  Heel to shin coordination: normal    Reflexes   Right brachioradialis: 2+  Left brachioradialis: 2+  Right biceps: 2+  Left biceps: 2+  Right triceps: 2+  Left triceps: 2+  Right patellar: 2+  Left patellar: 2+  Right achilles: 2+  Left achilles: 2+  Right : 2+  Left : 2+          Assessment/Plan   Independent Review of Radiographic Studies:      I personally reviewed the images from the following studies.    The postoperative x-rays done on 3/12/2021 show a very cage and the plate and the screws.  Agree with the report    Medical Decision Making:      She is willing to do her own therapy at home which I encouraged her to start.  She wants to go back to work as of 4/3/2021 without restrictions and so will provide the paperwork.  I asked her to come back and see me in 2 months.      Diagnoses and all orders for this visit:    1. Postoperative visit (Primary)      Return in about 2 months (around 5/12/2021) for Face to face.  Give return to work statement 4/3/2021, no restrictions.

## 2021-06-29 ENCOUNTER — TELEPHONE (OUTPATIENT)
Dept: NEUROSURGERY | Facility: CLINIC | Age: 56
End: 2021-06-29

## 2021-06-29 NOTE — TELEPHONE ENCOUNTER
PATIENT CALLED CANCELLED APPOINTMENT DOING WELL AND DOES NOT HAVE INSURANCE.    WILL CALL BACK AFTER NEW INSURANCE

## 2023-04-20 ENCOUNTER — HOSPITAL ENCOUNTER (EMERGENCY)
Facility: HOSPITAL | Age: 58
Discharge: HOME | End: 2023-04-20
Attending: EMERGENCY MEDICINE
Payer: MEDICAID

## 2023-04-20 VITALS
BODY MASS INDEX: 21.22 KG/M2 | HEART RATE: 91 BPM | TEMPERATURE: 99 F | OXYGEN SATURATION: 99 % | DIASTOLIC BLOOD PRESSURE: 103 MMHG | HEIGHT: 68 IN | SYSTOLIC BLOOD PRESSURE: 140 MMHG | WEIGHT: 140 LBS | RESPIRATION RATE: 16 BRPM

## 2023-04-20 DIAGNOSIS — F11.20 NARCOTIC DEPENDENCE: ICD-10-CM

## 2023-04-20 DIAGNOSIS — F11.93 NARCOTIC WITHDRAWAL: ICD-10-CM

## 2023-04-20 DIAGNOSIS — I10 UNCONTROLLED HYPERTENSION: Primary | ICD-10-CM

## 2023-04-20 PROCEDURE — 99283 EMERGENCY DEPT VISIT LOW MDM: CPT

## 2023-04-20 RX ORDER — CLONIDINE HYDROCHLORIDE 0.1 MG/1
0.1 TABLET ORAL ONCE
Status: COMPLETED | OUTPATIENT
Start: 2023-04-20 | End: 2023-04-20

## 2023-04-20 RX ORDER — ALPRAZOLAM 0.25 MG/1
0.5 TABLET ORAL ONCE
Status: COMPLETED | OUTPATIENT
Start: 2023-04-20 | End: 2023-04-20

## 2023-04-20 RX ORDER — ONDANSETRON 4 MG/1
4 TABLET, ORALLY DISINTEGRATING ORAL 4 TIMES DAILY PRN
Qty: 12 TABLET | Refills: 0 | Status: SHIPPED | OUTPATIENT
Start: 2023-04-20

## 2023-04-20 RX ORDER — MORPHINE SULFATE 30 MG/1
30 TABLET, FILM COATED, EXTENDED RELEASE ORAL ONCE
Status: COMPLETED | OUTPATIENT
Start: 2023-04-20 | End: 2023-04-20

## 2023-04-20 RX ADMIN — MORPHINE SULFATE 30 MG: 30 TABLET, FILM COATED, EXTENDED RELEASE ORAL at 13:39

## 2023-04-20 RX ADMIN — ALPRAZOLAM 0.5 MG: 0.25 TABLET ORAL at 13:40

## 2023-04-20 RX ADMIN — CLONIDINE HYDROCHLORIDE 0.1 MG: 0.1 TABLET ORAL at 13:40

## 2023-04-20 NOTE — ED PROVIDER NOTES
TRIAGE PROVIDER NOTE    I personally performed a brief face-to-face medical evaluation of the patient upon their arrival to the emergency department.  This exam was performed in an effort to decrease the time from intake to seeing a provider and to decrease delays in care.  The history of present illness is condensed.  Other providers may see the patient as well if deemed necessary after this evaluation.    HPI: Tatiana Reynolds is a 58 y.o. female with a PMH significant for chronic neck and back pain, migraines who presents to the ED c/o pain to the right upper extremity which is at baseline since a surgery to repair a broken right thumb and a broken humerus several weeks ago.  The patient states that she recently lost several of her Ambien medications which were filled by her pain management doctor.  Since that time her doctor decided to discontinue his care with her.  She no longer has her medications for pain or sleep and is experiencing withdrawal symptoms since waking up this morning.  She describes anxiety, restlessness, nausea, elevated blood pressures.  States that she was able to find an oxycodone tablet and took one half of this this morning which has seemed to help her symptoms somewhat.      FOCUSED PHYSICAL EXAM:  ED Triage Vitals   Temp Heart Rate Resp BP SpO2   04/20/23 1036 04/20/23 1036 04/20/23 1036 04/20/23 1049 04/20/23 1036   99 °F (37.2 °C) 91 16 (!) 182/106 99 %      Temp src Heart Rate Source Patient Position BP Location FiO2 (%)   04/20/23 1036 04/20/23 1036 04/20/23 1148 04/20/23 1148 --   Tympanic Monitor Sitting Left arm      General: No acute distress.  She is somewhat tearful and anxious.  Lungs: Clear to auscultation bilaterally, no wheezes  Heart: Regular rate and rhythm, no murmur  Abdomen: Soft, nontender, nondistended  Extremities: Right upper extremity is wrapped in a cast to the elbow.  There is also evidence of recent surgery to the anterior aspect of the right shoulder where  there is a postoperative incisional scar.      ASSESSMENT/PLAN:  Plan for a one-time dose of her home narcotic medications and consider consult with CCP for pain management referral.    No orders of the defined types were placed in this encounter.       Provider Attestation:  I personally reviewed the past medical history, past surgical history, social history, family history, current medications, and allergies as they appear in the chart.    1205 I, Shade Bazan PA-C, I evaluated the patient briefly at triage and performed an evaluation.  The contents of this note to this point have been provided by myself.     ***

## 2023-04-20 NOTE — ED TRIAGE NOTES
"Patient to er from home with c/o pain in right arm and hand. Once in triage room patient reported she is out of her pain meds and going through withdraw and did not want to tell the front triage staff.\" patient stated she needs help to find a pain clinic because she is out of her pain meds.\"     "

## 2023-04-20 NOTE — DISCHARGE INSTRUCTIONS
Follow-up with your primary care doctor as well as your orthopedist for further management of your pain and blood pressure.  Return here immediately for any fevers over 100.4, sudden weakness or numbness.
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
## 2024-12-25 ENCOUNTER — HOSPITAL ENCOUNTER (OUTPATIENT)
Facility: HOSPITAL | Age: 59
Setting detail: OBSERVATION
Discharge: LEFT BEFORE DISCHARGE | End: 2024-12-26
Attending: EMERGENCY MEDICINE | Admitting: INTERNAL MEDICINE

## 2024-12-25 ENCOUNTER — APPOINTMENT (OUTPATIENT)
Dept: GENERAL RADIOLOGY | Facility: HOSPITAL | Age: 59
End: 2024-12-25

## 2024-12-25 ENCOUNTER — APPOINTMENT (OUTPATIENT)
Dept: CT IMAGING | Facility: HOSPITAL | Age: 59
End: 2024-12-25

## 2024-12-25 DIAGNOSIS — R41.82 ALTERED MENTAL STATUS, UNSPECIFIED ALTERED MENTAL STATUS TYPE: Primary | ICD-10-CM

## 2024-12-25 DIAGNOSIS — F19.10 POLYSUBSTANCE ABUSE: ICD-10-CM

## 2024-12-25 LAB
ALBUMIN SERPL-MCNC: 3.8 G/DL (ref 3.5–5.2)
ALBUMIN/GLOB SERPL: 1.5 G/DL
ALP SERPL-CCNC: 72 U/L (ref 39–117)
ALT SERPL W P-5'-P-CCNC: 15 U/L (ref 1–33)
AMPHET+METHAMPHET UR QL: NEGATIVE
ANION GAP SERPL CALCULATED.3IONS-SCNC: 6.2 MMOL/L (ref 5–15)
APAP SERPL-MCNC: <5 MCG/ML (ref 0–30)
ARTERIAL PATENCY WRIST A: POSITIVE
AST SERPL-CCNC: 17 U/L (ref 1–32)
ATMOSPHERIC PRESS: 756.8 MMHG
BACTERIA UR QL AUTO: NORMAL /HPF
BARBITURATES UR QL SCN: NEGATIVE
BASE EXCESS BLDA CALC-SCNC: 2 MMOL/L (ref 0–2)
BASOPHILS # BLD AUTO: 0.04 10*3/MM3 (ref 0–0.2)
BASOPHILS NFR BLD AUTO: 0.5 % (ref 0–1.5)
BDY SITE: ABNORMAL
BENZODIAZ UR QL SCN: POSITIVE
BILIRUB SERPL-MCNC: 1 MG/DL (ref 0–1.2)
BILIRUB UR QL STRIP: NEGATIVE
BUN SERPL-MCNC: 20 MG/DL (ref 8–23)
BUN/CREAT SERPL: 11 (ref 7–25)
CALCIUM SPEC-SCNC: 8.9 MG/DL (ref 8.6–10.5)
CANNABINOIDS SERPL QL: POSITIVE
CHLORIDE SERPL-SCNC: 105 MMOL/L (ref 98–107)
CLARITY UR: ABNORMAL
CO2 BLDA-SCNC: 28.9 MMOL/L (ref 23–27)
CO2 SERPL-SCNC: 26.8 MMOL/L (ref 22–29)
COCAINE UR QL: NEGATIVE
COLOR UR: YELLOW
CREAT SERPL-MCNC: 1.81 MG/DL (ref 0.57–1)
DEPRECATED RDW RBC AUTO: 41.8 FL (ref 37–54)
DEVICE COMMENT: ABNORMAL
EGFRCR SERPLBLD CKD-EPI 2021: 31.7 ML/MIN/1.73
EOSINOPHIL # BLD AUTO: 0.47 10*3/MM3 (ref 0–0.4)
EOSINOPHIL NFR BLD AUTO: 6 % (ref 0.3–6.2)
ERYTHROCYTE [DISTWIDTH] IN BLOOD BY AUTOMATED COUNT: 12.1 % (ref 12.3–15.4)
ETHANOL BLD-MCNC: <10 MG/DL (ref 0–10)
ETHANOL UR QL: <0.01 %
FENTANYL UR-MCNC: POSITIVE NG/ML
GAS FLOW AIRWAY: 3 LPM
GLOBULIN UR ELPH-MCNC: 2.5 GM/DL
GLUCOSE BLDC GLUCOMTR-MCNC: 84 MG/DL (ref 70–130)
GLUCOSE BLDC GLUCOMTR-MCNC: 95 MG/DL (ref 70–130)
GLUCOSE SERPL-MCNC: 91 MG/DL (ref 65–99)
GLUCOSE UR STRIP-MCNC: NEGATIVE MG/DL
HCO3 BLDA-SCNC: 27.5 MMOL/L (ref 22–28)
HCT VFR BLD AUTO: 31.9 % (ref 34–46.6)
HEMODILUTION: NO
HGB BLD-MCNC: 10.3 G/DL (ref 12–15.9)
HGB UR QL STRIP.AUTO: NEGATIVE
HOLD SPECIMEN: NORMAL
HOLD SPECIMEN: NORMAL
HYALINE CASTS UR QL AUTO: NORMAL /LPF
IMM GRANULOCYTES # BLD AUTO: 0.03 10*3/MM3 (ref 0–0.05)
IMM GRANULOCYTES NFR BLD AUTO: 0.4 % (ref 0–0.5)
KETONES UR QL STRIP: ABNORMAL
LEUKOCYTE ESTERASE UR QL STRIP.AUTO: ABNORMAL
LYMPHOCYTES # BLD AUTO: 2.93 10*3/MM3 (ref 0.7–3.1)
LYMPHOCYTES NFR BLD AUTO: 37.2 % (ref 19.6–45.3)
MCH RBC QN AUTO: 30.8 PG (ref 26.6–33)
MCHC RBC AUTO-ENTMCNC: 32.3 G/DL (ref 31.5–35.7)
MCV RBC AUTO: 95.5 FL (ref 79–97)
METHADONE UR QL SCN: NEGATIVE
MODALITY: ABNORMAL
MONOCYTES # BLD AUTO: 0.54 10*3/MM3 (ref 0.1–0.9)
MONOCYTES NFR BLD AUTO: 6.9 % (ref 5–12)
NEUTROPHILS NFR BLD AUTO: 3.86 10*3/MM3 (ref 1.7–7)
NEUTROPHILS NFR BLD AUTO: 49 % (ref 42.7–76)
NITRITE UR QL STRIP: NEGATIVE
NRBC BLD AUTO-RTO: 0 /100 WBC (ref 0–0.2)
OPIATES UR QL: NEGATIVE
OXYCODONE UR QL SCN: NEGATIVE
PCO2 BLDA: 45.8 MM HG (ref 35–45)
PH BLDA: 7.39 PH UNITS (ref 7.35–7.45)
PH UR STRIP.AUTO: 5.5 [PH] (ref 5–8)
PLATELET # BLD AUTO: 219 10*3/MM3 (ref 140–450)
PMV BLD AUTO: 10.8 FL (ref 6–12)
PO2 BLDA: 174.2 MM HG (ref 80–100)
POTASSIUM SERPL-SCNC: 3.9 MMOL/L (ref 3.5–5.2)
PROT SERPL-MCNC: 6.3 G/DL (ref 6–8.5)
PROT UR QL STRIP: NEGATIVE
QT INTERVAL: 467 MS
QTC INTERVAL: 426 MS
RBC # BLD AUTO: 3.34 10*6/MM3 (ref 3.77–5.28)
RBC # UR STRIP: NORMAL /HPF
REF LAB TEST METHOD: NORMAL
SALICYLATES SERPL-MCNC: <0.3 MG/DL
SAO2 % BLDCOA: 99.5 % (ref 92–98.5)
SODIUM SERPL-SCNC: 138 MMOL/L (ref 136–145)
SP GR UR STRIP: 1.01 (ref 1–1.03)
SQUAMOUS #/AREA URNS HPF: NORMAL /HPF
TOTAL RATE: 16 BREATHS/MINUTE
TSH SERPL DL<=0.05 MIU/L-ACNC: 0.7 UIU/ML (ref 0.27–4.2)
UROBILINOGEN UR QL STRIP: ABNORMAL
WBC # UR STRIP: NORMAL /HPF
WBC NRBC COR # BLD AUTO: 7.87 10*3/MM3 (ref 3.4–10.8)
WHOLE BLOOD HOLD COAG: NORMAL
WHOLE BLOOD HOLD SPECIMEN: NORMAL

## 2024-12-25 PROCEDURE — P9612 CATHETERIZE FOR URINE SPEC: HCPCS

## 2024-12-25 PROCEDURE — 80307 DRUG TEST PRSMV CHEM ANLYZR: CPT | Performed by: EMERGENCY MEDICINE

## 2024-12-25 PROCEDURE — 82077 ASSAY SPEC XCP UR&BREATH IA: CPT | Performed by: NURSE PRACTITIONER

## 2024-12-25 PROCEDURE — 84443 ASSAY THYROID STIM HORMONE: CPT | Performed by: INTERNAL MEDICINE

## 2024-12-25 PROCEDURE — G0378 HOSPITAL OBSERVATION PER HR: HCPCS

## 2024-12-25 PROCEDURE — 94799 UNLISTED PULMONARY SVC/PX: CPT

## 2024-12-25 PROCEDURE — 25810000003 SODIUM CHLORIDE 0.9 % SOLUTION: Performed by: EMERGENCY MEDICINE

## 2024-12-25 PROCEDURE — 93005 ELECTROCARDIOGRAM TRACING: CPT | Performed by: NURSE PRACTITIONER

## 2024-12-25 PROCEDURE — 70450 CT HEAD/BRAIN W/O DYE: CPT

## 2024-12-25 PROCEDURE — 96372 THER/PROPH/DIAG INJ SC/IM: CPT

## 2024-12-25 PROCEDURE — 80143 DRUG ASSAY ACETAMINOPHEN: CPT | Performed by: EMERGENCY MEDICINE

## 2024-12-25 PROCEDURE — 85025 COMPLETE CBC W/AUTO DIFF WBC: CPT | Performed by: NURSE PRACTITIONER

## 2024-12-25 PROCEDURE — 71045 X-RAY EXAM CHEST 1 VIEW: CPT

## 2024-12-25 PROCEDURE — 25010000002 HEPARIN (PORCINE) PER 1000 UNITS: Performed by: INTERNAL MEDICINE

## 2024-12-25 PROCEDURE — 82948 REAGENT STRIP/BLOOD GLUCOSE: CPT

## 2024-12-25 PROCEDURE — 81001 URINALYSIS AUTO W/SCOPE: CPT | Performed by: NURSE PRACTITIONER

## 2024-12-25 PROCEDURE — 80179 DRUG ASSAY SALICYLATE: CPT | Performed by: EMERGENCY MEDICINE

## 2024-12-25 PROCEDURE — 80053 COMPREHEN METABOLIC PANEL: CPT | Performed by: NURSE PRACTITIONER

## 2024-12-25 PROCEDURE — 99291 CRITICAL CARE FIRST HOUR: CPT

## 2024-12-25 PROCEDURE — 25810000003 DEXTROSE 5 % AND SODIUM CHLORIDE 0.9 % 5-0.9 % SOLUTION: Performed by: INTERNAL MEDICINE

## 2024-12-25 PROCEDURE — 96374 THER/PROPH/DIAG INJ IV PUSH: CPT

## 2024-12-25 PROCEDURE — 25010000002 NALOXONE PER 1 MG

## 2024-12-25 PROCEDURE — 36600 WITHDRAWAL OF ARTERIAL BLOOD: CPT | Performed by: EMERGENCY MEDICINE

## 2024-12-25 PROCEDURE — 82803 BLOOD GASES ANY COMBINATION: CPT | Performed by: EMERGENCY MEDICINE

## 2024-12-25 PROCEDURE — 25810000003 SODIUM CHLORIDE 0.9 % SOLUTION: Performed by: NURSE PRACTITIONER

## 2024-12-25 RX ORDER — NALOXONE HCL 0.4 MG/ML
0.4 VIAL (ML) INJECTION ONCE
Status: COMPLETED | OUTPATIENT
Start: 2024-12-25 | End: 2024-12-25

## 2024-12-25 RX ORDER — BISACODYL 10 MG
10 SUPPOSITORY, RECTAL RECTAL DAILY PRN
Status: DISCONTINUED | OUTPATIENT
Start: 2024-12-25 | End: 2024-12-26 | Stop reason: HOSPADM

## 2024-12-25 RX ORDER — ONDANSETRON 2 MG/ML
4 INJECTION INTRAMUSCULAR; INTRAVENOUS EVERY 6 HOURS PRN
Status: DISCONTINUED | OUTPATIENT
Start: 2024-12-25 | End: 2024-12-26 | Stop reason: HOSPADM

## 2024-12-25 RX ORDER — LOSARTAN POTASSIUM 50 MG/1
1 TABLET ORAL DAILY
COMMUNITY
Start: 2024-09-15

## 2024-12-25 RX ORDER — ROPINIROLE 2 MG/1
2 TABLET, FILM COATED, EXTENDED RELEASE ORAL 2 TIMES DAILY
COMMUNITY

## 2024-12-25 RX ORDER — HEPARIN SODIUM 5000 [USP'U]/ML
5000 INJECTION, SOLUTION INTRAVENOUS; SUBCUTANEOUS EVERY 8 HOURS SCHEDULED
Status: DISCONTINUED | OUTPATIENT
Start: 2024-12-25 | End: 2024-12-26 | Stop reason: HOSPADM

## 2024-12-25 RX ORDER — FENTANYL 25 UG/1
1 PATCH TRANSDERMAL
COMMUNITY
Start: 2024-12-10 | End: 2025-01-09

## 2024-12-25 RX ORDER — ERGOCALCIFEROL 1.25 MG/1
1 CAPSULE, LIQUID FILLED ORAL
COMMUNITY
Start: 2024-10-29

## 2024-12-25 RX ORDER — SODIUM CHLORIDE 0.9 % (FLUSH) 0.9 %
10 SYRINGE (ML) INJECTION AS NEEDED
Status: DISCONTINUED | OUTPATIENT
Start: 2024-12-25 | End: 2024-12-26 | Stop reason: HOSPADM

## 2024-12-25 RX ORDER — ACETAMINOPHEN 650 MG/1
650 SUPPOSITORY RECTAL EVERY 4 HOURS PRN
Status: DISCONTINUED | OUTPATIENT
Start: 2024-12-25 | End: 2024-12-26 | Stop reason: HOSPADM

## 2024-12-25 RX ORDER — BISACODYL 5 MG/1
5 TABLET, DELAYED RELEASE ORAL DAILY PRN
Status: DISCONTINUED | OUTPATIENT
Start: 2024-12-25 | End: 2024-12-26 | Stop reason: HOSPADM

## 2024-12-25 RX ORDER — POLYETHYLENE GLYCOL 3350 17 G/17G
17 POWDER, FOR SOLUTION ORAL DAILY PRN
Status: DISCONTINUED | OUTPATIENT
Start: 2024-12-25 | End: 2024-12-26 | Stop reason: HOSPADM

## 2024-12-25 RX ORDER — SODIUM CHLORIDE 0.9 % (FLUSH) 0.9 %
10 SYRINGE (ML) INJECTION EVERY 12 HOURS SCHEDULED
Status: DISCONTINUED | OUTPATIENT
Start: 2024-12-25 | End: 2024-12-26 | Stop reason: HOSPADM

## 2024-12-25 RX ORDER — THIAMINE HYDROCHLORIDE 100 MG/ML
100 INJECTION, SOLUTION INTRAMUSCULAR; INTRAVENOUS DAILY
Status: DISCONTINUED | OUTPATIENT
Start: 2024-12-25 | End: 2024-12-26 | Stop reason: HOSPADM

## 2024-12-25 RX ORDER — AMOXICILLIN 250 MG
2 CAPSULE ORAL 2 TIMES DAILY
Status: DISCONTINUED | OUTPATIENT
Start: 2024-12-25 | End: 2024-12-26 | Stop reason: HOSPADM

## 2024-12-25 RX ORDER — NITROGLYCERIN 0.4 MG/1
0.4 TABLET SUBLINGUAL
Status: DISCONTINUED | OUTPATIENT
Start: 2024-12-25 | End: 2024-12-26 | Stop reason: HOSPADM

## 2024-12-25 RX ORDER — LOSARTAN POTASSIUM 100 MG/1
100 TABLET ORAL DAILY
COMMUNITY
Start: 2024-09-10

## 2024-12-25 RX ORDER — SODIUM CHLORIDE 9 MG/ML
250 INJECTION, SOLUTION INTRAVENOUS ONCE
Status: COMPLETED | OUTPATIENT
Start: 2024-12-25 | End: 2024-12-25

## 2024-12-25 RX ORDER — ESZOPICLONE 3 MG/1
3 TABLET, FILM COATED ORAL DAILY
COMMUNITY
Start: 2024-12-10

## 2024-12-25 RX ORDER — NALOXONE HYDROCHLORIDE 0.4 MG/ML
INJECTION, SOLUTION INTRAMUSCULAR; INTRAVENOUS; SUBCUTANEOUS
Status: COMPLETED
Start: 2024-12-25 | End: 2024-12-25

## 2024-12-25 RX ORDER — METHOCARBAMOL 750 MG/1
750 TABLET, FILM COATED ORAL 4 TIMES DAILY
COMMUNITY

## 2024-12-25 RX ORDER — ACETAMINOPHEN 325 MG/1
650 TABLET ORAL EVERY 4 HOURS PRN
Status: DISCONTINUED | OUTPATIENT
Start: 2024-12-25 | End: 2024-12-26 | Stop reason: HOSPADM

## 2024-12-25 RX ORDER — SODIUM CHLORIDE 9 MG/ML
40 INJECTION, SOLUTION INTRAVENOUS AS NEEDED
Status: DISCONTINUED | OUTPATIENT
Start: 2024-12-25 | End: 2024-12-26 | Stop reason: HOSPADM

## 2024-12-25 RX ORDER — DEXTROSE MONOHYDRATE AND SODIUM CHLORIDE 5; .9 G/100ML; G/100ML
125 INJECTION, SOLUTION INTRAVENOUS CONTINUOUS
Status: DISCONTINUED | OUTPATIENT
Start: 2024-12-25 | End: 2024-12-26 | Stop reason: HOSPADM

## 2024-12-25 RX ADMIN — SODIUM CHLORIDE 1000 ML: 9 INJECTION, SOLUTION INTRAVENOUS at 07:03

## 2024-12-25 RX ADMIN — DEXTROSE AND SODIUM CHLORIDE 125 ML/HR: 5; 900 INJECTION, SOLUTION INTRAVENOUS at 16:37

## 2024-12-25 RX ADMIN — SODIUM CHLORIDE 250 ML/HR: 9 INJECTION, SOLUTION INTRAVENOUS at 08:25

## 2024-12-25 RX ADMIN — NALOXONE HYDROCHLORIDE 0.4 MG: 0.4 INJECTION, SOLUTION INTRAMUSCULAR; INTRAVENOUS; SUBCUTANEOUS at 07:03

## 2024-12-25 RX ADMIN — Medication 0.4 MG: at 07:03

## 2024-12-25 RX ADMIN — HEPARIN SODIUM 5000 UNITS: 5000 INJECTION INTRAVENOUS; SUBCUTANEOUS at 15:15

## 2024-12-25 NOTE — ED NOTES
Ultrasound Inserted IV     Site: L FA     Catheter Length (in): 1.88 20 G     Diameter(cm):     Depth(cm):     Vascular Access Score=  1) Vein is easily palpable, visible and distended.   2) Vein is easily palpable, easily visible, but not distended.   3) Vein is easily palpable but not visible.   4) Vein is poorly palpable and/or poorly visible.  5) Vein is poorly palpable or non-palpable and/or poorly or non-visible.     Angiocath visualized and advanced in the venous lumen. Flush visualized superiorly to insertion site in venous lumen. Blood return noted and collected during insertion. No signs of phlebitis noted. Standard IV dressing placed and secured.

## 2024-12-25 NOTE — ED PROVIDER NOTES
EMERGENCY DEPARTMENT ENCOUNTER    Room Number:  12/12  PCP: Provider, No Known  Historian: EMS and significant other      HPI:  Chief Complaint: Altered mental status  A complete HPI/ROS/PMH/PSH/SH/FH are unobtainable due to: Altered mental status  Context: Tatiana Reynolds is a 59 y.o. female who presents to the ED c/o altered mental status.  Patient apparently has history of insomnia and back pain.  Patient does take Xanax at night.  Patient also takes medications for his restless legs.  Patient currently was fine last night.  Patient had significant other states she was not right.  Had difficulty to arouse her.  Patient arrived hypotensive.  Patient initially was able to answer questions.  Patient unable to answer any questions right now.  Significant other states she has never tried to overdose or hurt herself.            PAST MEDICAL HISTORY  Active Ambulatory Problems     Diagnosis Date Noted    Cervical disc disorder at C6-C7 level with radiculopathy 08/05/2016    Degeneration of lumbar intervertebral disc 08/05/2016    Chronic bilateral low back pain with left-sided sciatica 02/08/2019    Chronic neck pain 02/08/2019    Postlaminectomy syndrome, lumbar region 11/16/2020    Cervical disc disorder at C5-C6 level with radiculopathy 01/19/2021    Cervical stenosis of spine 02/02/2021    Postoperative hypotension 02/04/2021    Essential hypertension     Opioid dependence     Postoperative visit 02/17/2021     Resolved Ambulatory Problems     Diagnosis Date Noted    No Resolved Ambulatory Problems     Past Medical History:   Diagnosis Date    Anxiety     Arthritis     Chronic back pain     Hyperlipidemia     Injury of back     Migraine     Palpitations     Precordial pain     Shortness of breath          PAST SURGICAL HISTORY  Past Surgical History:   Procedure Laterality Date    ANTERIOR CERVICAL DISCECTOMY W/ FUSION N/A 2/2/2021    Procedure: CERVICAL 5-6 DISCECTOMY ANTERIOR WITH FUSION, WITH CAGES AND PLATE;   Surgeon: Neal Andrews MD;  Location: [facility] MAIN OR;  Service: Neurosurgery;  Laterality: N/A;    BACK SURGERY      lumbar X 2    BLADDER SUSPENSION      BREAST AUGMENTATION Bilateral      SECTION      DILATATION AND CURETTAGE      FOOT SURGERY Right     bunionectormy, x2, bunion and 2nd toe    LUMBAR SPINE SURGERY      SHOULDER SURGERY      slap tear repair    THORACIC SPINE SURGERY           FAMILY HISTORY  Family History   Problem Relation Age of Onset    Diabetes Mother     Hyperlipidemia Mother     Cancer Brother         COLON    Hypertension Brother     Heart disease Brother     Hypertension Brother     Malig Hyperthermia Neg Hx          SOCIAL HISTORY  Social History     Socioeconomic History    Marital status: Single    Number of children: 1    Years of education: COLLEGE   Tobacco Use    Smoking status: Former     Current packs/day: 0.50     Types: Cigarettes    Smokeless tobacco: Never    Tobacco comments:     started at age 14; quit on 2017   Vaping Use    Vaping status: Never Used   Substance and Sexual Activity    Alcohol use: No     Comment: Very Little caffeine use    Drug use: No     Types: Oxycodone     Comment: oxycodone, morphine prescribed by physician    Sexual activity: Defer         ALLERGIES  Patient has no known allergies.        REVIEW OF SYSTEMS  Review of Systems   Unresponsive      PHYSICAL EXAM  ED Triage Vitals   Temp Heart Rate Resp BP SpO2   -- 24 0646 24 0651 24 0646 24 0646    56 14 (!) 77/45 100 %      Temp src Heart Rate Source Patient Position BP Location FiO2 (%)   -- -- -- -- --              Physical Exam      GENERAL: Patient minimally responsive.  Does not answer questions or follow commands.  Patient does have gag reflex when challenged  HENT: nares patent  EYES: no scleral icterus  CV: Bradycardic  RESPIRATORY: normal effort  ABDOMEN: soft  MUSCULOSKELETAL: no deformity  NEURO: Unresponsive.  Does have gag reflex  PSYCH: Unable  to obtain  SKIN: warm, dry.  Does have writing all over her abdomen    Vital signs and nursing notes reviewed.          LAB RESULTS  Recent Results (from the past 24 hours)   ECG 12 Lead Altered Mental Status    Collection Time: 12/25/24  6:55 AM   Result Value Ref Range    QT Interval 467 ms    QTC Interval 426 ms   Comprehensive Metabolic Panel    Collection Time: 12/25/24  6:56 AM    Specimen: Blood   Result Value Ref Range    Glucose 91 65 - 99 mg/dL    BUN 20 8 - 23 mg/dL    Creatinine 1.81 (H) 0.57 - 1.00 mg/dL    Sodium 138 136 - 145 mmol/L    Potassium 3.9 3.5 - 5.2 mmol/L    Chloride 105 98 - 107 mmol/L    CO2 26.8 22.0 - 29.0 mmol/L    Calcium 8.9 8.6 - 10.5 mg/dL    Total Protein 6.3 6.0 - 8.5 g/dL    Albumin 3.8 3.5 - 5.2 g/dL    ALT (SGPT) 15 1 - 33 U/L    AST (SGOT) 17 1 - 32 U/L    Alkaline Phosphatase 72 39 - 117 U/L    Total Bilirubin 1.0 0.0 - 1.2 mg/dL    Globulin 2.5 gm/dL    A/G Ratio 1.5 g/dL    BUN/Creatinine Ratio 11.0 7.0 - 25.0    Anion Gap 6.2 5.0 - 15.0 mmol/L    eGFR 31.7 (L) >60.0 mL/min/1.73   Ethanol    Collection Time: 12/25/24  6:56 AM    Specimen: Blood   Result Value Ref Range    Ethanol <10 0 - 10 mg/dL    Ethanol % <0.010 %   CBC Auto Differential    Collection Time: 12/25/24  6:56 AM    Specimen: Blood   Result Value Ref Range    WBC 7.87 3.40 - 10.80 10*3/mm3    RBC 3.34 (L) 3.77 - 5.28 10*6/mm3    Hemoglobin 10.3 (L) 12.0 - 15.9 g/dL    Hematocrit 31.9 (L) 34.0 - 46.6 %    MCV 95.5 79.0 - 97.0 fL    MCH 30.8 26.6 - 33.0 pg    MCHC 32.3 31.5 - 35.7 g/dL    RDW 12.1 (L) 12.3 - 15.4 %    RDW-SD 41.8 37.0 - 54.0 fl    MPV 10.8 6.0 - 12.0 fL    Platelets 219 140 - 450 10*3/mm3    Neutrophil % 49.0 42.7 - 76.0 %    Lymphocyte % 37.2 19.6 - 45.3 %    Monocyte % 6.9 5.0 - 12.0 %    Eosinophil % 6.0 0.3 - 6.2 %    Basophil % 0.5 0.0 - 1.5 %    Immature Grans % 0.4 0.0 - 0.5 %    Neutrophils, Absolute 3.86 1.70 - 7.00 10*3/mm3    Lymphocytes, Absolute 2.93 0.70 - 3.10 10*3/mm3     Monocytes, Absolute 0.54 0.10 - 0.90 10*3/mm3    Eosinophils, Absolute 0.47 (H) 0.00 - 0.40 10*3/mm3    Basophils, Absolute 0.04 0.00 - 0.20 10*3/mm3    Immature Grans, Absolute 0.03 0.00 - 0.05 10*3/mm3    nRBC 0.0 0.0 - 0.2 /100 WBC   Green Top (Gel)    Collection Time: 12/25/24  6:56 AM   Result Value Ref Range    Extra Tube Hold for add-ons.    Lavender Top    Collection Time: 12/25/24  6:56 AM   Result Value Ref Range    Extra Tube hold for add-on    Gold Top - SST    Collection Time: 12/25/24  6:56 AM   Result Value Ref Range    Extra Tube Hold for add-ons.    Light Blue Top    Collection Time: 12/25/24  6:56 AM   Result Value Ref Range    Extra Tube Hold for add-ons.    Acetaminophen Level    Collection Time: 12/25/24  6:56 AM    Specimen: Blood   Result Value Ref Range    Acetaminophen <5.0 0.0 - 30.0 mcg/mL   Salicylate Level    Collection Time: 12/25/24  6:56 AM    Specimen: Blood   Result Value Ref Range    Salicylate <0.3 <=30.0 mg/dL   TSH Rfx On Abnormal To Free T4    Collection Time: 12/25/24  6:56 AM    Specimen: Blood   Result Value Ref Range    TSH 0.705 0.270 - 4.200 uIU/mL   POC Glucose Once    Collection Time: 12/25/24  7:01 AM    Specimen: Blood   Result Value Ref Range    Glucose 95 70 - 130 mg/dL   Urine Drug Screen - Urine, Clean Catch    Collection Time: 12/25/24  7:40 AM    Specimen: Urine, Clean Catch   Result Value Ref Range    Amphet/Methamphet, Screen Negative Negative    Barbiturates Screen, Urine Negative Negative    Benzodiazepine Screen, Urine Positive (A) Negative    Cocaine Screen, Urine Negative Negative    Opiate Screen Negative Negative    THC, Screen, Urine Positive (A) Negative    Methadone Screen, Urine Negative Negative    Oxycodone Screen, Urine Negative Negative    Fentanyl, Urine Positive (A) Negative   Urinalysis With Culture If Indicated - Straight Cath    Collection Time: 12/25/24  7:41 AM    Specimen: Straight Cath; Urine   Result Value Ref Range    Color, UA Yellow  Yellow, Straw    Appearance, UA Cloudy (A) Clear    pH, UA 5.5 5.0 - 8.0    Specific Gravity, UA 1.015 1.005 - 1.030    Glucose, UA Negative Negative    Ketones, UA Trace (A) Negative    Bilirubin, UA Negative Negative    Blood, UA Negative Negative    Protein, UA Negative Negative    Leuk Esterase, UA Trace (A) Negative    Nitrite, UA Negative Negative    Urobilinogen, UA 1.0 E.U./dL 0.2 - 1.0 E.U./dL   Urinalysis, Microscopic Only - Straight Cath    Collection Time: 12/25/24  7:41 AM    Specimen: Straight Cath; Urine   Result Value Ref Range    RBC, UA 0-2 None Seen, 0-2 /HPF    WBC, UA 0-2 None Seen, 0-2 /HPF    Bacteria, UA None Seen None Seen /HPF    Squamous Epithelial Cells, UA 0-2 None Seen, 0-2 /HPF    Hyaline Casts, UA 13-20 None Seen /LPF    Methodology Automated Microscopy    Blood Gas, Arterial -    Collection Time: 12/25/24  8:01 AM    Specimen: Arterial Blood   Result Value Ref Range    Site Right Radial     Joaquin's Test Positive     pH, Arterial 7.386 7.350 - 7.450 pH units    pCO2, Arterial 45.8 (H) 35.0 - 45.0 mm Hg    pO2, Arterial 174.2 (H) 80.0 - 100.0 mm Hg    HCO3, Arterial 27.5 22.0 - 28.0 mmol/L    Base Excess, Arterial 2.0 0.0 - 2.0 mmol/L    O2 Saturation, Arterial 99.5 (H) 92.0 - 98.5 %    CO2 Content 28.9 (H) 23 - 27 mmol/L    Barometric Pressure for Blood Gas 756.8000 mmHg    Modality Cannula     Flow Rate 3.0000 lpm    Rate 16 Breaths/minute    Hemodilution No     Device Comment sat 100%        Ordered the above labs and reviewed the results.        RADIOLOGY  CT Head Without Contrast    Result Date: 12/25/2024  CT HEAD WO CONTRAST-  INDICATIONS: Altered mental status  TECHNIQUE: Radiation dose reduction techniques were utilized, including automated exposure control and exposure modulation based on body size. Noncontrast head CT  COMPARISON: None available  FINDINGS:    No acute intracranial hemorrhage, midline shift or mass effect. No acute territorial infarct is identified.  Arterial  calcifications are seen at the base of the brain.  Ventricles, cisterns, cerebral sulci are unremarkable for patient age.  Minimal paranasal sinus mucosal thickening is present. The visualized paranasal sinuses, orbits, mastoid air cells are otherwise unremarkable.        No acute intracranial hemorrhage or hydrocephalus. If there is further clinical concern, MRI could be considered for further evaluation.  This report was finalized on 12/25/2024 7:33 AM by Dr. Ralph Griffin M.D on Workstation: [employer]      XR Chest 1 View    Result Date: 12/25/2024  XR CHEST 1 VW-  HISTORY: Female who is 59 years-old, altered mental status  TECHNIQUE: Frontal view of the chest  COMPARISON: 1/29/2021  FINDINGS: Heart size is normal. Aorta is calcified. Pulmonary vasculature is unremarkable. No focal pulmonary consolidation, pleural effusion, or pneumothorax is seen, limited by supine positioning. No acute osseous process.      No focal pulmonary consolidation. Follow-up as clinical indications persist.  This report was finalized on 12/25/2024 7:32 AM by Dr. Ralph Griffin M.D on Workstation: [id number]       Ordered the above noted radiological studies.  Chest x-ray independently interpreted by me and shows no evidence of pneumonia          PROCEDURES  Critical Care    Performed by: Guevara Centeno MD  Authorized by: Zach Cortez Jr., MD    Critical care provider statement:     Critical care time (minutes):  35    Critical care time was exclusive of:  Separately billable procedures and treating other patients    Critical care was necessary to treat or prevent imminent or life-threatening deterioration of the following conditions:  CNS failure or compromise    Critical care was time spent personally by me on the following activities:  Ordering and performing treatments and interventions, ordering and review of laboratory studies, re-evaluation of patient's condition and discussions with consultants        EKG           EKG time: 655  Rhythm/Rate: Sinus bradycardia 50  P waves and OR: Normal P waves  QRS, axis: Normal QRS  ST and T waves: Normal ST-T wave    Interpreted Contemporaneously by me, independently viewed  Unchanged compared to prior 2/3/21      MEDICATIONS GIVEN IN ER  Medications   sodium chloride 0.9 % flush 10 mL (has no administration in time range)   nitroglycerin (NITROSTAT) SL tablet 0.4 mg (has no administration in time range)   sodium chloride 0.9 % flush 10 mL (has no administration in time range)   sodium chloride 0.9 % flush 10 mL (has no administration in time range)   sodium chloride 0.9 % infusion 40 mL (has no administration in time range)   mupirocin (BACTROBAN) 2 % nasal ointment 1 Application (has no administration in time range)   sennosides-docusate (PERICOLACE) 8.6-50 MG per tablet 2 tablet (has no administration in time range)     And   polyethylene glycol (MIRALAX) packet 17 g (has no administration in time range)     And   bisacodyl (DULCOLAX) EC tablet 5 mg (has no administration in time range)     And   bisacodyl (DULCOLAX) suppository 10 mg (has no administration in time range)   heparin (porcine) 5000 UNIT/ML injection 5,000 Units (has no administration in time range)   dextrose 5 % and sodium chloride 0.9 % infusion (has no administration in time range)   Potassium Replacement - Follow Nurse / BPA Driven Protocol (has no administration in time range)   Magnesium Standard Dose Replacement - Follow Nurse / BPA Driven Protocol (has no administration in time range)   Phosphorus Replacement - Follow Nurse / BPA Driven Protocol (has no administration in time range)   Calcium Replacement - Follow Nurse / BPA Driven Protocol (has no administration in time range)   acetaminophen (TYLENOL) tablet 650 mg (has no administration in time range)     Or   acetaminophen (TYLENOL) suppository 650 mg (has no administration in time range)   ondansetron (ZOFRAN) injection 4 mg (has no administration in  time range)   sodium chloride 0.9 % bolus 1,000 mL (has no administration in time range)   thiamine (B-1) injection 100 mg (has no administration in time range)   sodium chloride 0.9 % bolus 1,000 mL (0 mL Intravenous Stopped 12/25/24 0740)   naloxone (NARCAN) injection 0.4 mg (0.4 mg Intravenous Given 12/25/24 0703)   sodium chloride 0.9 % infusion (250 mL/hr Intravenous New Bag 12/25/24 0825)                   MEDICAL DECISION MAKING, PROGRESS, and CONSULTS    All labs have been independently reviewed by me.  All radiology studies have been reviewed by me and I have also reviewed the radiology report.   EKG's independently viewed and interpreted by me.  Discussion below represents my analysis of pertinent findings related to patient's condition, differential diagnosis, treatment plan and final disposition.      Additional sources:  - Discussed/ obtained information from independent historians: Discussed with significant other as well as daughter    - External (non-ED) record review: Epic reviewed seen by primary doctor 12/10/2024 for insomnia    - Chronic or social conditions impacting care: None    - Shared decision making: None      Orders placed during this visit:  Orders Placed This Encounter   Procedures    XR Chest 1 View    CT Head Without Contrast    Comprehensive Metabolic Panel    [facility] Draw    Urinalysis With Culture If Indicated - Straight Cath    Ethanol    CBC Auto Differential    Acetaminophen Level    Urine Drug Screen - Urine, Clean Catch    Salicylate Level    Blood Gas, Arterial -    Urinalysis, Microscopic Only - Urine, Clean Catch    CBC (No Diff)    Basic Metabolic Panel    TSH Rfx On Abnormal To Free T4    Vitamin B12    Folate    Iron Profile    NPO Diet NPO Type: Strict NPO    Vital Signs Every Hour and Per Hospital Policy Based on Patient Condition    Telemetry - Place Orders & Notify Provider of Results When Patient Experiences Acute Chest Pain, Dysrhythmia or Respiratory Distress     Continuous Pulse Oximetry    Height & Weight    Daily Weights    Intake & Output    Oral Care - Patient Not on NPPV & Not Intubated    Target Arousal Level RASS 0 to -2    Use Mobility Guidelines for Advancement of Activity    Daily CHG Bath While in Critical Care    Maintain IV Access    ICU / CCU - Place Order Consult Intensivist For Critical Care Management (If Patient Not Admitted to Cardiology for Primary Cardiology Condition)    ICU / CCU - Notify All Physicians When Patient is Transferred    Code Status and Medical Interventions: CPR (Attempt to Resuscitate); Full Support    LHA (on-call MD unless specified) Details    Pulmonology (on-call MD unless specified)    Capnography (ETCO2) Monitoring    POC Glucose Once    ECG 12 Lead Altered Mental Status    Insert Peripheral IV    Insert Peripheral IV    Initiate Observation Status    Initiate Observation Status    CBC & Differential    Green Top (Gel)    Lavender Top    Gold Top - SST    Light Blue Top         Additional orders considered but not ordered:  None        Differential diagnosis includes but is not limited to:    Polysubstance abuse, accidental overdose      Independent interpretation of labs, radiology studies, and discussions with consultants:  ED Course as of 12/25/24 1026   Wed Dec 25, 2024   0706 07:06 EST  Accu-Chek normal.  No response to Narcan [SL]   0727 07:28 EST  Patient did wake up and was able to answer few questions before going back to sleep.  Blood pressure is improved. [SL]   0847 08:48 EST  Patient still very sleepy.  Patient's blood pressure still soft.  Started on fluids again.  Patient's urine drug screen positive for benzos and fentanyl.  Patient did not wake up at all with the Narcan.  Patient's daughter is here and states she has never been suicidal.  There is some question that patient has been seeing another man besides her significant other.  Patient has been discussed with Dr. Cortez who will accept her to the intensive  care unit [SL]   1009 10:09 EST  Patient now much more awake.  Patient states that she does not know how she got drawn on.  Patient states does think she took too much Xanax and also took muscle relaxer.  Patient still very ataxic to walk.  Probably does not need intensive care unit. [SL]      ED Course User Index  [SL] Guevara Centeno MD                 DIAGNOSIS  Final diagnoses:   Altered mental status, unspecified altered mental status type   Polysubstance abuse         DISPOSITION  admit            Latest Documented Vital Signs:  As of 10:26 EST  BP- 103/66 HR- 53 Temp- 96.9 °F (36.1 °C) (Tympanic) O2 sat- 100%              --    Please note that portions of this were completed with a voice recognition program.       Note Disclaimer: At [facility], we believe that sharing information builds trust and better relationships. You are receiving this note because you are receiving care at [facility] or recently visited. It is possible you will see health information before a provider has talked with you about it. This kind of information can be easy to misunderstand. To help you fully understand what it means for your health, we urge you to discuss this note with your provider.            Guevara Centeno MD  12/25/24 8756

## 2024-12-25 NOTE — H&P
Patient Care Team:  Provider, No Known as PCP - Barry Riddle MD as Consulting Physician (Pain Medicine)  Virginia Ferro MD as Consulting Physician (Obstetrics and Gynecology)  Ray Currie MD as Consulting Physician (Vascular Surgery)  Jason Aviles III, MD as Consulting Physician (Psychiatry)      Subjective     Patient is a 59 y.o. female.  Admit for altered mental status suspected drug overdose.  Patient not reportedly on fentanyl drug screen positive for fentanyl and THC is also positive for benzodiazepines which she is reportedly taking.  Daughter has arrived and is Helpful patient will wake up say a few words follows some simple commands but is not really providing any history.  Daughter says she is not supposed to be on any fentanyl.  Daughter reports patient does have a recreational drug abuse history she does not have a suicidal history that the daughter is aware of.  Patient does have chronic back pain she has had 4-5 back surgeries per daughter she has hypertension.  She does not know any other major medical problems.      Review of Systems:  Unable to obtain      History  Past Medical History:   Diagnosis Date    Anxiety     Arthritis     thoracic    Chronic back pain     managed by Dr. Barry Riley     Chronic neck pain     managed by Dr. Barry Riley    Essential hypertension     Hyperlipidemia     borderline per patient     Injury of back     Migraine     Palpitations     Precordial pain     Shortness of breath      Past Surgical History:   Procedure Laterality Date    ANTERIOR CERVICAL DISCECTOMY W/ FUSION N/A 2021    Procedure: CERVICAL 5-6 DISCECTOMY ANTERIOR WITH FUSION, WITH CAGES AND PLATE;  Surgeon: Neal Andrews MD;  Location: [facility];  Service: Neurosurgery;  Laterality: N/A;    BACK SURGERY      lumbar X 2    BLADDER SUSPENSION      BREAST AUGMENTATION Bilateral      SECTION      DILATATION AND CURETTAGE      FOOT SURGERY Right      bunionectormy, x2, bunion and 2nd toe    LUMBAR SPINE SURGERY      SHOULDER SURGERY      slap tear repair    THORACIC SPINE SURGERY       Social History     Socioeconomic History    Marital status: Single    Number of children: 1    Years of education: COLLEGE   Tobacco Use    Smoking status: Former     Current packs/day: 0.50     Types: Cigarettes    Smokeless tobacco: Never    Tobacco comments:     started at age 14; quit on 6/25/2017   Vaping Use    Vaping status: Never Used   Substance and Sexual Activity    Alcohol use: No     Comment: Very Little caffeine use    Drug use: No     Types: Oxycodone     Comment: oxycodone, morphine prescribed by physician    Sexual activity: Defer     Family History   Problem Relation Age of Onset    Diabetes Mother     Hyperlipidemia Mother     Cancer Brother         COLON    Hypertension Brother     Heart disease Brother     Hypertension Brother     Malig Hyperthermia Neg Hx          Allergies:  Patient has no known allergies.    Medications:  Prior to Admission medications    Medication Sig Start Date End Date Taking? Authorizing Provider   ALPRAZolam (XANAX) 0.5 MG tablet Take 1 tablet by mouth 2 (Two) Times a Day As Needed for Anxiety.    Tati Huff MD   amLODIPine (NORVASC) 10 MG tablet  2/13/21   Tati Huff MD   carisoprodol (SOMA) 350 MG tablet Take 1 tablet by mouth 4 (Four) Times a Day As Needed for Muscle Spasms.    Tati Huff MD   estradiol (MINIVELLE, VIVELLE-DOT) 0.05 MG/24HR patch Place 1 patch on the skin as directed by provider 2 (Two) Times a Week.    Tati Huff MD   lidocaine (LIDODERM) 5 % Place 1 patch on the skin as directed by provider Daily. Remove & Discard patch within 12 hours or as directed by Tati Doll MD   medroxyPROGESTERone (PROVERA) 2.5 MG tablet Take 1 tablet by mouth Daily.    Tati Huff MD   medroxyPROGESTERone (PROVERA) 5 MG tablet Take 1 tablet by mouth Daily. 5/26/20    Corinne Jacobs APRN   Morphine (MS CONTIN) 30 MG 12 hr tablet Take 1 tablet by mouth 2 (Two) Times a Day. 2/2/21   Tati Huff MD   ondansetron ODT (ZOFRAN-ODT) 4 MG disintegrating tablet Place 1 tablet on the tongue 4 (Four) Times a Day As Needed for Nausea or Vomiting. 4/20/23   Javier Leslie MD   oxyCODONE (ROXICODONE) 30 MG immediate release tablet Take 1 tablet by mouth Every 6 (Six) Hours As Needed for Moderate Pain.    Tati Huff MD   pregabalin (LYRICA) 75 MG capsule Take 1 capsule by mouth 2 (Two) Times a Day. 12/21/20   Tati Huff MD   tiZANidine (ZANAFLEX) 4 MG tablet Take 1 tablet by mouth 3 (Three) Times a Day. 11/19/20      zolpidem (AMBIEN) 10 MG tablet Take 1 tablet by mouth At Night As Needed for Sleep.    Tati Huff MD             Objective     Vital Signs  Vital Sign Min/Max for last 24 hours  Temp  Min: 96.9 °F (36.1 °C)  Max: 96.9 °F (36.1 °C)   BP  Min: 77/45  Max: 114/64   Pulse  Min: 51  Max: 56   Resp  Min: 14  Max: 14   SpO2  Min: 99 %  Max: 100 %   Flow (L/min) (Oxygen Therapy)  Min: 3  Max: 3   No data recorded       Intake/Output Summary (Last 24 hours) at 12/25/2024 0848  Last data filed at 12/25/2024 0740  Gross per 24 hour   Intake 1000 ml   Output --   Net 1000 ml     I/O this shift:  In: 1000 [IV Piggyback:1000]  Out: -   Last Weight and Admission Weight    There were no vitals filed for this visit.      There is no height or weight on file to calculate BMI.           Physical Exam:  General Appearance: White female she is resting in bed she will wake up follow a few simple commands she said a few words but really was not answering questions really well.  She does not look in distress she is on room air saturating 100%, her pulse is in the 60s and her blood pressure was 90/58  Eyes: Conjunctiva are clear and anicteric pupils are about 4 mm equal and reactive to light notes she did get some Narcan recently.  ENT: Mucous membranes  are little dry no erythema no exudates nasal septum appears midline she is not opening her mouth really wide makes it hard to  her airway size  Neck: No adenopathy or thyromegaly no jugular venous tension trachea midline  Lungs: Clear nonlabored symmetric expansion  Cardiac: Regular rate rhythm no murmur no gallop  Abdomen: Soft nontender no palpable hepatosplenomegaly or masses  : Not examined  Musculoskeletal: Grossly normal  Skin: Warm and dry no jaundice no petechiae  Neuro: She is arousable she follows commands with all 4 extremities wiggled her toes and feet squeeze my hands she said a couple of words but she was not answering questions that was spontaneous speech.  She really was not opening her mouth command and she resisted eye opening would not open her eyes to command  Extremities/P Vascular: No clubbing no cyanosis no edema palpable radial and dorsalis pedis pulses  MSE: Hard to       Labs:  Results from last 7 days   Lab Units 12/25/24  0656   GLUCOSE mg/dL 91   SODIUM mmol/L 138   POTASSIUM mmol/L 3.9   CO2 mmol/L 26.8   CHLORIDE mmol/L 105   ANION GAP mmol/L 6.2   CREATININE mg/dL 1.81*   BUN mg/dL 20   BUN / CREAT RATIO  11.0   CALCIUM mg/dL 8.9   ALK PHOS U/L 72   TOTAL PROTEIN g/dL 6.3   ALT (SGPT) U/L 15   AST (SGOT) U/L 17   BILIRUBIN mg/dL 1.0   ALBUMIN g/dL 3.8   GLOBULIN gm/dL 2.5     CrCl cannot be calculated (Unknown ideal weight.).      Results from last 7 days   Lab Units 12/25/24  0656   WBC 10*3/mm3 7.87   RBC 10*6/mm3 3.34*   HEMOGLOBIN g/dL 10.3*   HEMATOCRIT % 31.9*   MCV fL 95.5   MCH pg 30.8   MCHC g/dL 32.3   RDW % 12.1*   RDW-SD fl 41.8   MPV fL 10.8   PLATELETS 10*3/mm3 219   NEUTROPHIL % % 49.0   LYMPHOCYTE % % 37.2   MONOCYTES % % 6.9   EOSINOPHIL % % 6.0   BASOPHIL % % 0.5   IMM GRAN % % 0.4   NEUTROS ABS 10*3/mm3 3.86   LYMPHS ABS 10*3/mm3 2.93   MONOS ABS 10*3/mm3 0.54   EOS ABS 10*3/mm3 0.47*   BASOS ABS 10*3/mm3 0.04   IMMATURE GRANS (ABS) 10*3/mm3 0.03   NRBC  /100 WBC 0.0     Results from last 7 days   Lab Units 12/25/24  0801   PH, ARTERIAL pH units 7.386   PO2 ART mm Hg 174.2*   PCO2, ARTERIAL mm Hg 45.8*   HCO3 ART mmol/L 27.5                         Microbiology Results (last 10 days)       ** No results found for the last 240 hours. **              Diagnostics:  CT Head Without Contrast    Result Date: 12/25/2024  CT HEAD WO CONTRAST-  INDICATIONS: Altered mental status  TECHNIQUE: Radiation dose reduction techniques were utilized, including automated exposure control and exposure modulation based on body size. Noncontrast head CT  COMPARISON: None available  FINDINGS:    No acute intracranial hemorrhage, midline shift or mass effect. No acute territorial infarct is identified.  Arterial calcifications are seen at the base of the brain.  Ventricles, cisterns, cerebral sulci are unremarkable for patient age.  Minimal paranasal sinus mucosal thickening is present. The visualized paranasal sinuses, orbits, mastoid air cells are otherwise unremarkable.        No acute intracranial hemorrhage or hydrocephalus. If there is further clinical concern, MRI could be considered for further evaluation.  This report was finalized on 12/25/2024 7:33 AM by Dr. Ralph Griffin M.D on Workstation: [id number]      XR Chest 1 View    Result Date: 12/25/2024  XR CHEST 1 VW-  HISTORY: Female who is 59 years-old, altered mental status  TECHNIQUE: Frontal view of the chest  COMPARISON: 1/29/2021  FINDINGS: Heart size is normal. Aorta is calcified. Pulmonary vasculature is unremarkable. No focal pulmonary consolidation, pleural effusion, or pneumothorax is seen, limited by supine positioning. No acute osseous process.      No focal pulmonary consolidation. Follow-up as clinical indications persist.  This report was finalized on 12/25/2024 7:32 AM by Dr. Ralph Griffin M.D on Workstation: [id number]      Results for orders placed during the hospital encounter of 07/03/17    Adult  Transthoracic Echo Complete    Interpretation Summary  · Left ventricular systolic function is normal. Calculated EF = 66.7%. Estimated EF was in agreement with the calculated EF. Normal left ventricular cavity size and wall thickness noted. All left ventricular wall segments contract normally.  · Left ventricular diastolic function is normal.  · No significant valvular stenosis or insufficiency      CT head reviewed no definite active process nothing acute noted chest x-ray also reviewed and nothing acute    Assessment & Plan     Altered mental status likely drug overdose will monitor with capnography and oximetry make sure she does not hypoventilate and needed intubation.  Hypotension blood pressure is better we will continue IV fluids I think she is dry  Renal insufficiency back in March of this year she had normal creatinine of 0.92 this is probably acute kidney injury will give her some IV fluids and monitor.  Anemia mild this may also be due to looks like her hemoglobin was 13.5 back in March 2024 the last I can find.  Send off anemia studies      Zach Cortez Jr, MD  12/25/24  08:48 EST    Time: Critical care time 41 minutes

## 2024-12-25 NOTE — PLAN OF CARE
AAOX4. KELVIN. -SB. Pharmacy consulted for polypharmacy. Daughter at bedside. Home medication sent to pharmacy to be stored.         Goal Outcome Evaluation:  Plan of Care Reviewed With: patient, child        Progress: improving            Problem: Adult Inpatient Plan of Care  Goal: Plan of Care Review  Outcome: Progressing  Flowsheets (Taken 12/25/2024 1845)  Progress: improving  Plan of Care Reviewed With:   patient   child  Goal: Patient-Specific Goal (Individualized)  Outcome: Progressing  Goal: Absence of Hospital-Acquired Illness or Injury  Outcome: Progressing  Intervention: Identify and Manage Fall Risk  Recent Flowsheet Documentation  Taken 12/25/2024 1812 by Ivett Pulido RN  Safety Promotion/Fall Prevention:   safety round/check completed   room organization consistent   nonskid shoes/slippers when out of bed   lighting adjusted   clutter free environment maintained   activity supervised   assistive device/personal items within reach  Taken 12/25/2024 1637 by Ivett Pulido RN  Safety Promotion/Fall Prevention:   safety round/check completed   room organization consistent   nonskid shoes/slippers when out of bed   lighting adjusted   activity supervised   clutter free environment maintained   assistive device/personal items within reach  Taken 12/25/2024 1400 by Ivett Pulido RN  Safety Promotion/Fall Prevention:   activity supervised   assistive device/personal items within reach   clutter free environment maintained   fall prevention program maintained   safety round/check completed   room organization consistent   nonskid shoes/slippers when out of bed  Intervention: Prevent Skin Injury  Recent Flowsheet Documentation  Taken 12/25/2024 1812 by Ivett Pulido RN  Body Position: position changed independently  Taken 12/25/2024 1637 by Ivett Pulido RN  Body Position: position changed independently  Taken 12/25/2024 1400 by Ivett uPlido RN  Body Position: sitting up in bed  Skin  Protection:   protective footwear used   transparent dressing maintained  Intervention: Prevent and Manage VTE (Venous Thromboembolism) Risk  Recent Flowsheet Documentation  Taken 12/25/2024 1400 by Ivett Pulido RN  VTE Prevention/Management: (SQ Heparin)   bilateral   SCDs (sequential compression devices) off   other (see comments)  Goal: Optimal Comfort and Wellbeing  Outcome: Progressing  Intervention: Provide Person-Centered Care  Recent Flowsheet Documentation  Taken 12/25/2024 1400 by Ivett Pulido RN  Trust Relationship/Rapport:   care explained   choices provided   emotional support provided   empathic listening provided   questions answered   questions encouraged   reassurance provided   thoughts/feelings acknowledged  Goal: Readiness for Transition of Care  Outcome: Progressing     Problem: Violence Risk or Actual  Goal: Anger and Impulse Control  Outcome: Progressing  Intervention: Minimize Safety Risk  Recent Flowsheet Documentation  Taken 12/25/2024 1812 by Ivett Pulido RN  Enhanced Safety Measures:   bed alarm set   room near unit station  Taken 12/25/2024 1637 by Ivett Pulido RN  Enhanced Safety Measures:   bed alarm set   family to remain at bedside   room near unit station  Taken 12/25/2024 1400 by Ivett Pulido RN  Enhanced Safety Measures:   bed alarm set   family to remain at bedside   room near unit station     Problem: Fall Injury Risk  Goal: Absence of Fall and Fall-Related Injury  Outcome: Progressing  Intervention: Identify and Manage Contributors  Recent Flowsheet Documentation  Taken 12/25/2024 1812 by Ivett Pulido RN  Medication Review/Management: medications reviewed  Taken 12/25/2024 1637 by Ivett Pulido RN  Medication Review/Management: medications reviewed  Taken 12/25/2024 1400 by Ivett Pulido RN  Medication Review/Management: medications reviewed  Intervention: Promote Injury-Free Environment  Recent Flowsheet Documentation  Taken 12/25/2024  1812 by Ivett Pulido RN  Safety Promotion/Fall Prevention:   safety round/check completed   room organization consistent   nonskid shoes/slippers when out of bed   lighting adjusted   clutter free environment maintained   activity supervised   assistive device/personal items within reach  Taken 12/25/2024 1637 by Ivett Pulido RN  Safety Promotion/Fall Prevention:   safety round/check completed   room organization consistent   nonskid shoes/slippers when out of bed   lighting adjusted   activity supervised   clutter free environment maintained   assistive device/personal items within reach  Taken 12/25/2024 1400 by Ivett Pulido RN  Safety Promotion/Fall Prevention:   activity supervised   assistive device/personal items within reach   clutter free environment maintained   fall prevention program maintained   safety round/check completed   room organization consistent   nonskid shoes/slippers when out of bed     Problem: Comorbidity Management  Goal: Maintenance of Asthma Control  Outcome: Progressing  Intervention: Maintain Asthma Symptom Control  Recent Flowsheet Documentation  Taken 12/25/2024 1812 by Ivett Pulido RN  Medication Review/Management: medications reviewed  Taken 12/25/2024 1637 by Ivett Pulido RN  Medication Review/Management: medications reviewed  Taken 12/25/2024 1400 by Ivett Pulido RN  Medication Review/Management: medications reviewed  Goal: Maintenance of Behavioral Health Symptom Control  Outcome: Progressing  Intervention: Maintain Behavioral Health Symptom Control  Recent Flowsheet Documentation  Taken 12/25/2024 1812 by Ivett Pulido RN  Medication Review/Management: medications reviewed  Taken 12/25/2024 1637 by Ivett Pulido RN  Medication Review/Management: medications reviewed  Taken 12/25/2024 1400 by Ivett Pulido RN  Medication Review/Management: medications reviewed  Goal: Blood Pressure in Desired Range  Outcome: Progressing  Intervention:  Maintain Blood Pressure Management  Recent Flowsheet Documentation  Taken 12/25/2024 1812 by Ivett Pulido RN  Medication Review/Management: medications reviewed  Taken 12/25/2024 1637 by Ivett Pulido RN  Medication Review/Management: medications reviewed  Taken 12/25/2024 1400 by Ivett Pulido RN  Medication Review/Management: medications reviewed  Goal: Maintenance of Osteoarthritis Symptom Control  Outcome: Progressing  Intervention: Maintain Osteoarthritis Symptom Control  Recent Flowsheet Documentation  Taken 12/25/2024 1812 by Ivett Pulido RN  Medication Review/Management: medications reviewed  Taken 12/25/2024 1637 by Ivett Pulido RN  Medication Review/Management: medications reviewed  Taken 12/25/2024 1400 by Ivett Pulido RN  Medication Review/Management: medications reviewed

## 2024-12-25 NOTE — ED NOTES
Nursing report ED to floor  Tatiana Reynolds  59 y.o.  female    HPI :  HPI  Stated Reason for Visit: Pt to ED from home for altered mental status, hypotension. Pt responsive to pain at time of traige. LKN 0000.  History Obtained From: EMS    Chief Complaint  Chief Complaint   Patient presents with    Altered Mental Status       Admitting doctor:   Zach Cortez Jr., MD    Admitting diagnosis:   The primary encounter diagnosis was Altered mental status, unspecified altered mental status type. A diagnosis of Polysubstance abuse was also pertinent to this visit.    Code status:   Current Code Status       Date Active Code Status Order ID Comments User Context       12/25/2024 0911 CPR (Attempt to Resuscitate) [id number]  Zach Cortez Jr., MD ED        Question Answer    Code Status (Patient has no pulse and is not breathing) CPR (Attempt to Resuscitate)    Medical Interventions (Patient has pulse or is breathing) Full Support                    Allergies:   Patient has no known allergies.    Isolation:   No active isolations    Intake and Output    Intake/Output Summary (Last 24 hours) at 12/25/2024 1041  Last data filed at 12/25/2024 0740  Gross per 24 hour   Intake 1000 ml   Output --   Net 1000 ml       Weight:   There were no vitals filed for this visit.    Most recent vitals:   Vitals:    12/25/24 0858 12/25/24 0905 12/25/24 0916 12/25/24 1009   BP: 90/55 99/60 103/66 129/97   Pulse: 52 59 53 64   Resp:       Temp:       TempSrc:       SpO2: 100% 98% 100% 100%       Active LDAs/IV Access:   Lines, Drains & Airways       Active LDAs       None                    Labs (abnormal labs have a star):   Labs Reviewed   COMPREHENSIVE METABOLIC PANEL - Abnormal; Notable for the following components:       Result Value    Creatinine 1.81 (*)     eGFR 31.7 (*)     All other components within normal limits    Narrative:     GFR Categories in Chronic Kidney Disease (CKD)      GFR Category          GFR  (mL/min/1.73)    Interpretation  G1                     90 or greater         Normal or high (1)  G2                      60-89                Mild decrease (1)  G3a                   45-59                Mild to moderate decrease  G3b                   30-44                Moderate to severe decrease  G4                    15-29                Severe decrease  G5                    14 or less           Kidney failure          (1)In the absence of evidence of kidney disease, neither GFR category G1 or G2 fulfill the criteria for CKD.    eGFR calculation 2021 CKD-EPI creatinine equation, which does not include race as a factor   URINALYSIS W/ CULTURE IF INDICATED - Abnormal; Notable for the following components:    Appearance, UA Cloudy (*)     Ketones, UA Trace (*)     Leuk Esterase, UA Trace (*)     All other components within normal limits    Narrative:     In absence of clinical symptoms, the presence of pyuria, bacteria, and/or nitrites on the urinalysis result does not correlate with infection.   CBC WITH AUTO DIFFERENTIAL - Abnormal; Notable for the following components:    RBC 3.34 (*)     Hemoglobin 10.3 (*)     Hematocrit 31.9 (*)     RDW 12.1 (*)     Eosinophils, Absolute 0.47 (*)     All other components within normal limits   URINE DRUG SCREEN - Abnormal; Notable for the following components:    Benzodiazepine Screen, Urine Positive (*)     THC, Screen, Urine Positive (*)     Fentanyl, Urine Positive (*)     All other components within normal limits    Narrative:     Negative Thresholds Per Drugs Screened:    Amphetamines                 500 ng/ml  Barbiturates                 200 ng/ml  Benzodiazepines              100 ng/ml  Cocaine                      300 ng/ml  Methadone                    300 ng/ml  Opiates                      300 ng/ml  Oxycodone                    100 ng/ml  THC                           50 ng/ml  Fentanyl                       5 ng/ml      The Normal Value for all drugs tested  is negative. This report includes final unconfirmed screening results to be used for medical treatment purposes only. Unconfirmed results must not be used for non-medical purposes such as employment or legal testing. Clinical consideration should be applied to any drug of abuse test, particularly when unconfirmed results are used.           BLOOD GAS, ARTERIAL - Abnormal; Notable for the following components:    pCO2, Arterial 45.8 (*)     pO2, Arterial 174.2 (*)     O2 Saturation, Arterial 99.5 (*)     CO2 Content 28.9 (*)     All other components within normal limits   ACETAMINOPHEN LEVEL - Normal   SALICYLATE LEVEL - Normal    Narrative:     Therapeutic range for Salicylates:  3.0 - 10.0 mg/dL for antipyretic/analgesic conditions  15.0 - 30.0 mg/dL for anti-inflammatory conditions   TSH RFX ON ABNORMAL TO FREE T4 - Normal   POCT GLUCOSE FINGERSTICK - Normal   RAINBOW DRAW    Narrative:     The following orders were created for panel order Mio Draw.  Procedure                               Abnormality         Status                     ---------                               -----------         ------                     Green Top (Gel)[[id number]]                                  Final result               Lavender Top[[id number]]                                     Final result               Gold Top - SST[[id number]]                                   Final result               Light Blue Top[[id number]]                                   Final result                 Please view results for these tests on the individual orders.   ETHANOL   URINALYSIS, MICROSCOPIC ONLY   CBC AND DIFFERENTIAL    Narrative:     The following orders were created for panel order CBC & Differential.  Procedure                               Abnormality         Status                     ---------                               -----------         ------                     CBC Auto Differential[[id number]]        Abnormal            Final  result                 Please view results for these tests on the individual orders.   GREEN TOP   LAVENDER TOP   GOLD TOP - SST   LIGHT BLUE TOP       EKG:   ECG 12 Lead Altered Mental Status   Preliminary Result   HEART RATE=50  bpm   RR Qkmsgpnx=2877  ms   OK Reuzwdtq=963  ms   P Horizontal Axis=48  deg   P Front Axis=43  deg   QRSD Interval=97  ms   QT Bzhwarah=762  ms   ASbW=349  ms   QRS Axis=70  deg   T Wave Axis=46  deg   - OTHERWISE NORMAL ECG -   Sinus rhythm   Borderline short OK interval   Date and Time of Study:2024-12-25 06:55:12          Meds given in ED:   Medications   sodium chloride 0.9 % flush 10 mL (has no administration in time range)   nitroglycerin (NITROSTAT) SL tablet 0.4 mg (has no administration in time range)   sodium chloride 0.9 % flush 10 mL (has no administration in time range)   sodium chloride 0.9 % flush 10 mL (has no administration in time range)   sodium chloride 0.9 % infusion 40 mL (has no administration in time range)   mupirocin (BACTROBAN) 2 % nasal ointment 1 Application (has no administration in time range)   sennosides-docusate (PERICOLACE) 8.6-50 MG per tablet 2 tablet (has no administration in time range)     And   polyethylene glycol (MIRALAX) packet 17 g (has no administration in time range)     And   bisacodyl (DULCOLAX) EC tablet 5 mg (has no administration in time range)     And   bisacodyl (DULCOLAX) suppository 10 mg (has no administration in time range)   heparin (porcine) 5000 UNIT/ML injection 5,000 Units (has no administration in time range)   dextrose 5 % and sodium chloride 0.9 % infusion (has no administration in time range)   Potassium Replacement - Follow Nurse / BPA Driven Protocol (has no administration in time range)   Magnesium Standard Dose Replacement - Follow Nurse / BPA Driven Protocol (has no administration in time range)   Phosphorus Replacement - Follow Nurse / BPA Driven Protocol (has no administration in time range)   Calcium Replacement -  Follow Nurse / BPA Driven Protocol (has no administration in time range)   acetaminophen (TYLENOL) tablet 650 mg (has no administration in time range)     Or   acetaminophen (TYLENOL) suppository 650 mg (has no administration in time range)   ondansetron (ZOFRAN) injection 4 mg (has no administration in time range)   sodium chloride 0.9 % bolus 1,000 mL (has no administration in time range)   thiamine (B-1) injection 100 mg (has no administration in time range)   sodium chloride 0.9 % bolus 1,000 mL (0 mL Intravenous Stopped 12/25/24 0740)   naloxone (NARCAN) injection 0.4 mg (0.4 mg Intravenous Given 12/25/24 0703)   sodium chloride 0.9 % infusion (250 mL/hr Intravenous New Bag 12/25/24 0825)       Imaging results:  CT Head Without Contrast    Result Date: 12/25/2024   No acute intracranial hemorrhage or hydrocephalus. If there is further clinical concern, MRI could be considered for further evaluation.  This report was finalized on 12/25/2024 7:33 AM by Dr. Ralph Griffin M.D on Workstation: [email or web address]      XR Chest 1 View    Result Date: 12/25/2024  No focal pulmonary consolidation. Follow-up as clinical indications persist.  This report was finalized on 12/25/2024 7:32 AM by Dr. Ralph Griffin M.D on Workstation: [email or web address]       Ambulatory status:   - stand by    Social issues:   Social History     Socioeconomic History    Marital status: Single    Number of children: 1    Years of education: COLLEGE   Tobacco Use    Smoking status: Former     Current packs/day: 0.50     Types: Cigarettes    Smokeless tobacco: Never    Tobacco comments:     started at age 14; quit on 6/25/2017   Vaping Use    Vaping status: Never Used   Substance and Sexual Activity    Alcohol use: No     Comment: Very Little caffeine use    Drug use: No     Types: Oxycodone     Comment: oxycodone, morphine prescribed by physician    Sexual activity: Defer       Peripheral Neurovascular  Peripheral Neurovascular (Adult)  Peripheral  Neurovascular WDL: WDL, all  Capillary Refill, General: less than/equal to 3 secs  Capillary Refill, LUE: less than/equal to 3 secs  Capillary Refill, RUE: less than/equal to 3 secs  Capillary Refill, LLE: less than/equal to 3 secs  Capillary Refill, RLE: less than/equal to 3 secs  LUE Neurovascular Assessment  Temperature LUE: warm  Color LUE: no discoloration  RUE Neurovascular Assessment  Temperature RUE: warm  Color RUE: no discoloration  LLE Neurovascular Assessment  Temperature LLE: warm  Color LLE: no discoloration  RLE Neurovascular Assessment  Temperature RLE: warm  Color RLE: no discoloration    Neuro Cognitive  Neuro Cognitive (Adult)  Cognitive/Neuro/Behavioral WDL: .WDL except  Level of Consciousness: Alert  Arousal Level: opens eyes spontaneously  Orientation: oriented x 4  Speech: clear, spontaneous, logical  Mood/Behavior: calm, cooperative, behavior appropriate to situation  Last Known Well Date: 12/24/24  Last Known Well Time: 1100  Pupils  Pupil PERRLA: yes    Learning  Learning Assessment  Learning Readiness and Ability: cognitive limitation noted    Respiratory  Respiratory WDL  Respiratory WDL: .WDL except, all  Rhythm/Pattern, Respiratory: shallow    Abdominal Pain       Pain Assessments  Pain (Adult)  Preferred Pain Scale: FACES (Prieto-Baker FACES Pain Rating Scale)  Patient requested Medication Prescribed for Lower Pain Scale: No  FACES Pain Rating: Rest: 0-->no hurt  FACES Pain Rating: Activity: 0-->no hurt    NIH Stroke Scale       Yessi Quigley RN  12/25/24 10:41 EST

## 2024-12-25 NOTE — Clinical Note
Level of Care: Critical Care [6]   Diagnosis: AMS (altered mental status) [[id number]]   Admitting Physician: DOMENIC MCNEAL JR [[id number]]   Attending Physician: DOMENIC MCNEAL JR [[id number]]

## 2024-12-26 VITALS
TEMPERATURE: 98 F | DIASTOLIC BLOOD PRESSURE: 49 MMHG | RESPIRATION RATE: 20 BRPM | WEIGHT: 154.1 LBS | OXYGEN SATURATION: 94 % | HEIGHT: 68 IN | HEART RATE: 56 BPM | BODY MASS INDEX: 23.36 KG/M2 | SYSTOLIC BLOOD PRESSURE: 100 MMHG

## 2024-12-26 LAB
ANION GAP SERPL CALCULATED.3IONS-SCNC: 6.8 MMOL/L (ref 5–15)
BUN SERPL-MCNC: 12 MG/DL (ref 8–23)
BUN/CREAT SERPL: 10.7 (ref 7–25)
CALCIUM SPEC-SCNC: 8.5 MG/DL (ref 8.6–10.5)
CHLORIDE SERPL-SCNC: 113 MMOL/L (ref 98–107)
CO2 SERPL-SCNC: 22.2 MMOL/L (ref 22–29)
CREAT SERPL-MCNC: 1.12 MG/DL (ref 0.57–1)
DEPRECATED RDW RBC AUTO: 41.1 FL (ref 37–54)
EGFRCR SERPLBLD CKD-EPI 2021: 56.4 ML/MIN/1.73
ERYTHROCYTE [DISTWIDTH] IN BLOOD BY AUTOMATED COUNT: 11.8 % (ref 12.3–15.4)
FOLATE SERPL-MCNC: 8.16 NG/ML (ref 4.78–24.2)
GLUCOSE SERPL-MCNC: 146 MG/DL (ref 65–99)
HCT VFR BLD AUTO: 31.6 % (ref 34–46.6)
HGB BLD-MCNC: 10.3 G/DL (ref 12–15.9)
IRON 24H UR-MRATE: 38 MCG/DL (ref 37–145)
IRON SATN MFR SERPL: 11 % (ref 20–50)
MCH RBC QN AUTO: 31.2 PG (ref 26.6–33)
MCHC RBC AUTO-ENTMCNC: 32.6 G/DL (ref 31.5–35.7)
MCV RBC AUTO: 95.8 FL (ref 79–97)
PLATELET # BLD AUTO: 195 10*3/MM3 (ref 140–450)
PMV BLD AUTO: 10.5 FL (ref 6–12)
POTASSIUM SERPL-SCNC: 4.2 MMOL/L (ref 3.5–5.2)
QT INTERVAL: 431 MS
QTC INTERVAL: 385 MS
RBC # BLD AUTO: 3.3 10*6/MM3 (ref 3.77–5.28)
SODIUM SERPL-SCNC: 142 MMOL/L (ref 136–145)
TIBC SERPL-MCNC: 332 MCG/DL (ref 298–536)
TRANSFERRIN SERPL-MCNC: 223 MG/DL (ref 200–360)
VIT B12 BLD-MCNC: 319 PG/ML (ref 211–946)
WBC NRBC COR # BLD AUTO: 9.1 10*3/MM3 (ref 3.4–10.8)

## 2024-12-26 PROCEDURE — 82746 ASSAY OF FOLIC ACID SERUM: CPT | Performed by: INTERNAL MEDICINE

## 2024-12-26 PROCEDURE — G0378 HOSPITAL OBSERVATION PER HR: HCPCS

## 2024-12-26 PROCEDURE — 93005 ELECTROCARDIOGRAM TRACING: CPT | Performed by: INTERNAL MEDICINE

## 2024-12-26 PROCEDURE — 83540 ASSAY OF IRON: CPT | Performed by: INTERNAL MEDICINE

## 2024-12-26 PROCEDURE — 96372 THER/PROPH/DIAG INJ SC/IM: CPT

## 2024-12-26 PROCEDURE — 80048 BASIC METABOLIC PNL TOTAL CA: CPT | Performed by: INTERNAL MEDICINE

## 2024-12-26 PROCEDURE — 36415 COLL VENOUS BLD VENIPUNCTURE: CPT | Performed by: INTERNAL MEDICINE

## 2024-12-26 PROCEDURE — 84466 ASSAY OF TRANSFERRIN: CPT | Performed by: INTERNAL MEDICINE

## 2024-12-26 PROCEDURE — 82607 VITAMIN B-12: CPT | Performed by: INTERNAL MEDICINE

## 2024-12-26 PROCEDURE — 85027 COMPLETE CBC AUTOMATED: CPT | Performed by: INTERNAL MEDICINE

## 2024-12-26 PROCEDURE — 25010000002 THIAMINE HCL 200 MG/2ML SOLUTION: Performed by: INTERNAL MEDICINE

## 2024-12-26 PROCEDURE — 25010000002 HEPARIN (PORCINE) PER 1000 UNITS: Performed by: INTERNAL MEDICINE

## 2024-12-26 PROCEDURE — 96375 TX/PRO/DX INJ NEW DRUG ADDON: CPT

## 2024-12-26 RX ADMIN — THIAMINE HYDROCHLORIDE 100 MG: 100 INJECTION, SOLUTION INTRAMUSCULAR; INTRAVENOUS at 08:31

## 2024-12-26 RX ADMIN — MUPIROCIN 1 APPLICATION: 20 OINTMENT TOPICAL at 08:31

## 2024-12-26 RX ADMIN — SENNOSIDES AND DOCUSATE SODIUM 2 TABLET: 50; 8.6 TABLET ORAL at 08:31

## 2024-12-26 RX ADMIN — Medication 10 ML: at 01:15

## 2024-12-26 RX ADMIN — MUPIROCIN 1 APPLICATION: 20 OINTMENT TOPICAL at 01:15

## 2024-12-26 RX ADMIN — HEPARIN SODIUM 5000 UNITS: 5000 INJECTION INTRAVENOUS; SUBCUTANEOUS at 01:15

## 2024-12-26 RX ADMIN — HEPARIN SODIUM 5000 UNITS: 5000 INJECTION INTRAVENOUS; SUBCUTANEOUS at 08:31
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
## (undated) DEVICE — DRP Z/FRICTION 10X16IN

## (undated) DEVICE — DISPOSABLE BIPOLAR FORCEPS 7 3/4" (19.7CM) SCOVILLE BAYONET, 1.5MM TIP AND 12 FT. (3.6M) CABLE: Brand: KIRWAN

## (undated) DEVICE — VIOLET BRAIDED (POLYGLACTIN 910), SYNTHETIC ABSORBABLE SUTURE: Brand: COATED VICRYL

## (undated) DEVICE — DRILL BIT 7080510 11 MM DRILL BIT S

## (undated) DEVICE — GLV SURG PREMIERPRO ORTHO LTX PF SZ8 BRN

## (undated) DEVICE — SUT VIC 4/0 P3 18IN J494G

## (undated) DEVICE — Device

## (undated) DEVICE — SMOKE EVACUATION TUBING WITH 7/8 IN TO 1/4 IN REDUCER: Brand: BUFFALO FILTER

## (undated) DEVICE — NEEDLE, QUINCKE, 20GX3.5": Brand: MEDLINE

## (undated) DEVICE — GOWN,NON-REINFORCED,SIRUS,SET IN SLV,XXL: Brand: MEDLINE

## (undated) DEVICE — PK NEURO SPINE 40

## (undated) DEVICE — GLV SURG BIOGEL LTX PF 6 1/2

## (undated) DEVICE — LIMB HOLDER, WRIST/ANKLE: Brand: DEROYAL

## (undated) DEVICE — TOOL MR8-14MH30 MR8 14CM MATCH 3MM: Brand: MIDAS REX MR8

## (undated) DEVICE — DRP MICROSCOPE 4 BINOCULAR CV 54X150IN

## (undated) DEVICE — CONN TBG Y 5 IN 1 LF STRL

## (undated) DEVICE — COVER,MAYO STAND,STERILE: Brand: MEDLINE